# Patient Record
Sex: FEMALE | Race: WHITE | NOT HISPANIC OR LATINO | Employment: FULL TIME | URBAN - METROPOLITAN AREA
[De-identification: names, ages, dates, MRNs, and addresses within clinical notes are randomized per-mention and may not be internally consistent; named-entity substitution may affect disease eponyms.]

---

## 2023-08-30 LAB
CHLAMYDIA TRACH., AMPLIFIED: NEGATIVE
N. GONORRHEA, AMPLIFIED: NEGATIVE
TRICHOMONAS VAGINALIS: NEGATIVE

## 2023-10-24 PROBLEM — O46.90 VAGINAL BLEEDING IN PREGNANCY (HHS-HCC): Status: ACTIVE | Noted: 2023-10-24

## 2023-10-24 PROBLEM — H46.9 OPTIC NEURITIS, LEFT: Status: ACTIVE | Noted: 2023-10-24

## 2023-10-26 ENCOUNTER — PROCEDURE VISIT (OUTPATIENT)
Dept: OBSTETRICS AND GYNECOLOGY | Facility: CLINIC | Age: 23
End: 2023-10-26
Payer: COMMERCIAL

## 2023-10-26 ENCOUNTER — APPOINTMENT (OUTPATIENT)
Dept: OBSTETRICS AND GYNECOLOGY | Facility: CLINIC | Age: 23
End: 2023-10-26
Payer: COMMERCIAL

## 2023-10-26 VITALS
DIASTOLIC BLOOD PRESSURE: 82 MMHG | SYSTOLIC BLOOD PRESSURE: 136 MMHG | BODY MASS INDEX: 44.17 KG/M2 | WEIGHT: 265.1 LBS | HEIGHT: 65 IN

## 2023-10-26 DIAGNOSIS — R87.619 ABNORMAL CERVICAL PAPANICOLAOU SMEAR, UNSPECIFIED ABNORMAL PAP FINDING: ICD-10-CM

## 2023-10-26 DIAGNOSIS — R87.612 LGSIL ON PAP SMEAR OF CERVIX: Primary | ICD-10-CM

## 2023-10-26 LAB — PREGNANCY TEST URINE, POC: NEGATIVE

## 2023-10-26 PROCEDURE — 88342 IMHCHEM/IMCYTCHM 1ST ANTB: CPT

## 2023-10-26 PROCEDURE — 57454 BX/CURETT OF CERVIX W/SCOPE: CPT | Performed by: OBSTETRICS & GYNECOLOGY

## 2023-10-26 PROCEDURE — 81025 URINE PREGNANCY TEST: CPT | Performed by: OBSTETRICS & GYNECOLOGY

## 2023-10-26 PROCEDURE — 88305 TISSUE EXAM BY PATHOLOGIST: CPT

## 2023-10-26 NOTE — PROGRESS NOTES
Colposcopy Procedure Note    Indications: Pap smear 2 months ago showed: low-grade squamous intraepithelial neoplasia (LGSIL - encompassing HPV,mild dysplasia,HERNÁN I). The prior pap showed no abnormalities.    Procedure Details   The risks and benefits of the procedure and Written informed consent obtained.    Speculum placed in vagina and excellent visualization of cervix achieved, cervix swabbed x 3 with acetic acid solution.    Findings:  Cervix: acetowhite lesion(s) noted at 12 o'clock; endocervical curettage performed and cervical biopsies taken at 12 o'clock.  Vaginal inspection: normal without visible lesions.  Vulvar colposcopy: normal mucosa without lesions.    Specimens: Cervical biopsy at 12:00, ECC    Complications: none.    Plan:  Specimens labelled and sent to Pathology.  Will base further treatment on Pathology findings.  Treatment options discussed with patient.  Post biopsy instructions given to patient.Patient ID: Zuleika Mendoza is a 23 y.o. female.    Procedures

## 2023-11-14 LAB
LAB AP ASR DISCLAIMER: NORMAL
LABORATORY COMMENT REPORT: NORMAL
PATH REPORT.FINAL DX SPEC: NORMAL
PATH REPORT.GROSS SPEC: NORMAL
PATH REPORT.RELEVANT HX SPEC: NORMAL
PATH REPORT.TOTAL CANCER: NORMAL
RESIDENT REVIEW: NORMAL

## 2023-11-15 ENCOUNTER — TELEPHONE (OUTPATIENT)
Dept: OBSTETRICS AND GYNECOLOGY | Facility: CLINIC | Age: 23
End: 2023-11-15
Payer: COMMERCIAL

## 2023-11-15 NOTE — TELEPHONE ENCOUNTER
Spoke to patient on the phone.  Reviewed recent colposcopy biopsies with high-grade MICHAEL on cervix and ECC.  Recommend LEEP procedure, risk benefits and alternatives explained and she agreed.  We will schedule surgery and proceed accordingly.

## 2023-11-21 ENCOUNTER — PREP FOR PROCEDURE (OUTPATIENT)
Dept: OBSTETRICS AND GYNECOLOGY | Facility: CLINIC | Age: 23
End: 2023-11-21
Payer: COMMERCIAL

## 2023-11-21 DIAGNOSIS — R87.613 HGSIL (HIGH GRADE SQUAMOUS INTRAEPITHELIAL LESION) ON PAP SMEAR OF CERVIX: Primary | ICD-10-CM

## 2023-12-05 ENCOUNTER — TELEPHONE (OUTPATIENT)
Dept: OBSTETRICS AND GYNECOLOGY | Facility: CLINIC | Age: 23
End: 2023-12-05
Payer: COMMERCIAL

## 2023-12-05 PROBLEM — R87.613 HGSIL (HIGH GRADE SQUAMOUS INTRAEPITHELIAL LESION) ON PAP SMEAR OF CERVIX: Status: ACTIVE | Noted: 2023-11-21

## 2023-12-05 NOTE — TELEPHONE ENCOUNTER
Patient is scheduled for a LEEP on 1/8/2024.  Pre admission testing explained to patient and she was instructed to call with any questions.

## 2024-01-03 ENCOUNTER — PRE-ADMISSION TESTING (OUTPATIENT)
Dept: PREADMISSION TESTING | Facility: HOSPITAL | Age: 24
End: 2024-01-03
Payer: COMMERCIAL

## 2024-01-03 VITALS
DIASTOLIC BLOOD PRESSURE: 80 MMHG | OXYGEN SATURATION: 98 % | HEART RATE: 82 BPM | TEMPERATURE: 98.1 F | SYSTOLIC BLOOD PRESSURE: 135 MMHG | BODY MASS INDEX: 43.64 KG/M2 | HEIGHT: 65 IN | RESPIRATION RATE: 20 BRPM | WEIGHT: 261.91 LBS

## 2024-01-03 DIAGNOSIS — R87.613 HGSIL (HIGH GRADE SQUAMOUS INTRAEPITHELIAL LESION) ON PAP SMEAR OF CERVIX: ICD-10-CM

## 2024-01-03 DIAGNOSIS — Z01.818 PRE-OP TESTING: ICD-10-CM

## 2024-01-03 LAB
ABO GROUP (TYPE) IN BLOOD: NORMAL
ANION GAP SERPL CALC-SCNC: 12 MMOL/L (ref 10–20)
ANTIBODY SCREEN: NORMAL
BUN SERPL-MCNC: 10 MG/DL (ref 6–23)
CALCIUM SERPL-MCNC: 9.5 MG/DL (ref 8.6–10.3)
CHLORIDE SERPL-SCNC: 104 MMOL/L (ref 98–107)
CO2 SERPL-SCNC: 25 MMOL/L (ref 21–32)
CREAT SERPL-MCNC: 0.64 MG/DL (ref 0.5–1.05)
ERYTHROCYTE [DISTWIDTH] IN BLOOD BY AUTOMATED COUNT: 12.1 % (ref 11.5–14.5)
GFR SERPL CREATININE-BSD FRML MDRD: >90 ML/MIN/1.73M*2
GLUCOSE SERPL-MCNC: 80 MG/DL (ref 74–99)
HCT VFR BLD AUTO: 41.2 % (ref 36–46)
HGB BLD-MCNC: 14.3 G/DL (ref 12–16)
MCH RBC QN AUTO: 32.1 PG (ref 26–34)
MCHC RBC AUTO-ENTMCNC: 34.7 G/DL (ref 32–36)
MCV RBC AUTO: 93 FL (ref 80–100)
NRBC BLD-RTO: 0 /100 WBCS (ref 0–0)
PLATELET # BLD AUTO: 366 X10*3/UL (ref 150–450)
POTASSIUM SERPL-SCNC: 4.4 MMOL/L (ref 3.5–5.3)
RBC # BLD AUTO: 4.45 X10*6/UL (ref 4–5.2)
RH FACTOR (ANTIGEN D): NORMAL
SODIUM SERPL-SCNC: 137 MMOL/L (ref 136–145)
WBC # BLD AUTO: 11.2 X10*3/UL (ref 4.4–11.3)

## 2024-01-03 PROCEDURE — 36415 COLL VENOUS BLD VENIPUNCTURE: CPT

## 2024-01-03 PROCEDURE — 93010 ELECTROCARDIOGRAM REPORT: CPT | Performed by: INTERNAL MEDICINE

## 2024-01-03 PROCEDURE — 85027 COMPLETE CBC AUTOMATED: CPT

## 2024-01-03 PROCEDURE — 93005 ELECTROCARDIOGRAM TRACING: CPT

## 2024-01-03 PROCEDURE — 86900 BLOOD TYPING SEROLOGIC ABO: CPT

## 2024-01-03 PROCEDURE — 99203 OFFICE O/P NEW LOW 30 MIN: CPT | Performed by: NURSE PRACTITIONER

## 2024-01-03 PROCEDURE — 94760 N-INVAS EAR/PLS OXIMETRY 1: CPT

## 2024-01-03 PROCEDURE — 82374 ASSAY BLOOD CARBON DIOXIDE: CPT

## 2024-01-03 RX ORDER — ASCORBIC ACID 500 MG
500 TABLET ORAL DAILY
COMMUNITY

## 2024-01-03 ASSESSMENT — DUKE ACTIVITY SCORE INDEX (DASI)
DASI METS SCORE: 9
CAN YOU WALK A BLOCK OR TWO ON LEVEL GROUND: YES
CAN YOU PARTICIPATE IN MODERATE RECREATIONAL ACTIVITIES LIKE GOLF, BOWLING, DANCING, DOUBLES TENNIS OR THROWING A BASEBALL OR FOOTBALL: YES
TOTAL_SCORE: 50.7
CAN YOU DO HEAVY WORK AROUND THE HOUSE LIKE SCRUBBING FLOORS OR LIFTING AND MOVING HEAVY FURNITURE: YES
CAN YOU WALK INDOORS, SUCH AS AROUND YOUR HOUSE: YES
CAN YOU RUN A SHORT DISTANCE: YES
CAN YOU DO LIGHT WORK AROUND THE HOUSE LIKE DUSTING OR WASHING DISHES: YES
CAN YOU PARTICIPATE IN STRENOUS SPORTS LIKE SWIMMING, SINGLES TENNIS, FOOTBALL, BASKETBALL, OR SKIING: NO
CAN YOU CLIMB A FLIGHT OF STAIRS OR WALK UP A HILL: YES
CAN YOU TAKE CARE OF YOURSELF (EAT, DRESS, BATHE, OR USE TOILET): YES
CAN YOU DO YARD WORK LIKE RAKING LEAVES, WEEDING OR PUSHING A MOWER: YES
CAN YOU HAVE SEXUAL RELATIONS: YES
CAN YOU DO MODERATE WORK AROUND THE HOUSE LIKE VACUUMING, SWEEPING FLOORS OR CARRYING GROCERIES: YES

## 2024-01-03 ASSESSMENT — PAIN SCALES - GENERAL: PAINLEVEL_OUTOF10: 0 - NO PAIN

## 2024-01-03 NOTE — PREPROCEDURE INSTRUCTIONS
Medication List            Accurate as of January 3, 2024  1:07 PM. Always use your most recent med list.                ascorbic acid 500 mg tablet  Commonly known as: Vitamin C  Medication Adjustments for Surgery: Stop 7 days before surgery     calcium citrate-vitamin D2 250 mg-2.5 mcg (100 unit) tablet  Medication Adjustments for Surgery: Stop 7 days before surgery     ocrelizumab 600 mg in sodium chloride 0.9% 480 mL IV  Medication Adjustments for Surgery: Continue until night before surgery     ORKAMBI ORAL  Medication Adjustments for Surgery: Stop 7 days before surgery  Notes to patient: Patient does NOT take, need to remove this medication            PRE-OPERATIVE INSTRUCTIONS FOR SURGERY    *Do not eat anything after midnight the night of surgery.  This includes food of any kind (including hard candy, cough drops, mints and gum) and beverages (including coffee and soda).        *One of our staff members will call you ONE business day before your surgery, between 11a-2p  (expect call Friday 1/5/24)  To let you know the time to arrive.      If you have no received a call by 2 pm, call [phone]    *When you arrive at the hospital-->GO TO Registration on the ground floor    *Stop smoking 24 hours prior to surgery.  No Marijuana, CBD Oil or Vaping for 48 hours    *No alcohol 24 hours prior to surgery    *You will need a responsible adult to drive you home    -No acrylic nails or nail polish on at least one fingernail, NO polish on toes for foot surgery    -You may be asked to remove your dentures, partial plate, eyeglasses or contact lenses before going to surgery.  Please bring a case for these items.    -Body piercings need to be removed.  Jewelry and valuables should be left at home.    -Put on loose,  comfortable, clean clothing, that will accommodate bandages    MORNING OF SURGERY:    Take shower to remove all skin lotions, perfumes, deodorant, etc.  CO NOT REAPPLY ANYTHING TO HAIR OR SKIN.    -What  you may be asked to bring to surgery:  ___photo ID an insurance information  ___Urine specimen          NPO Instructions:    Do not eat any food after midnight the night before your surgery/procedure.    Additional Instructions:     Day of Surgery:  Wear  comfortable loose fitting clothing  Do not use moisturizers, creams, lotions or perfume  All jewelry and valuables should be left at home

## 2024-01-03 NOTE — H&P (VIEW-ONLY)
"CPM/PAT Evaluation       Name: Zuleiak Mendoza (Zuleika Mendoza)  /Age: 2000/23 y.o.     In-Person       Chief Complaint: HGSIL on pap smear of cervix    23 yr old female with c/o abnormal pap smear.  Reports incidental finding during routine gynological screening.  Reports hx of in office \"pinch biopsy\" which showed need for further treatment upon test results.  Denies any symptoms or changes in menses although mentral flow is heavy but has become the norm for more than a year ago.  Reports remaining otherwise physically active, denies cardiac or respiratory symptoms.  No previous general anesthesia.           Past Medical History:   Diagnosis Date    Immunocompromised (CMS/HCC)     Migraines     Multiple sclerosis (CMS/HCC)        Past Surgical History:   Procedure Laterality Date    OTHER SURGICAL HISTORY  2021    Surgically induced        Patient Sexual activity questions deferred to the physician.    Family History   Problem Relation Name Age of Onset    Hip dysplasia Mother      Anemia Mother      Migraines Mother      Migraines Father         Allergies   Allergen Reactions    Amoxicillin Rash    Penicillins Rash       Prior to Admission medications    Not on File        Review of Systems    Constitutional: no fever, no chills and not feeling poorly.   Eyes: no eyesight problems.   ENT: no hearing loss, no nosebleeds and no sore throat.   Cardiovascular: no chest pain, no palpitations and no extremity edema.   Respiratory: no shortness of breath, no wheezing, no cough and no sob with exertion.   Gastrointestinal: negative for abdominal pain, blood in stools or changes in bowel habits   Genitourinary: negative for dysuria, incontinence or changes in urinary habits   Musculoskeletal: no arthralgias and no gait abnormality.   Integumentary: negative for lesions, rash or itching.   Neurological: negative for confusion, dizziness, fainting or difficulty walking.   Psychiatric: not suicidal, no " anxiety and no depression.   All other systems have been reviewed and are negative for complaint.     Physical Exam  Constitutional:       General: No acute distress.     Aox3, pleasant and cooperative, appropriate mood and eye contact   HENT:      Head: Normocephalic.      Mouth/Throat: Mucous membranes moist & pink  Eyes:      Vision grossly intact, PERRLA, corrective lenses   Neck:      No carotid bruit, no JVD  Cardiovascular:      RRR, S1S2, no murmurs, rubs or gallops  Pulmonary:      Symmetric chest expansion, CTA, Room Air  Abdominal:      Soft non-tender, BSx4   Skin:     Warm, dry & intact   Extremities:      No gross deformities; normal gait   Neurological:      No focal deficit, Aox3, RAYMOND x4  Psychiatric:      Pleasant & cooperative, appropriate affect    PAT AIRWAY:   Airway:     Mallampati::  II    TM distance::  >3 FB    Neck ROM::  Full  normal        Visit Vitals  /80   Pulse 82   Temp 36.7 °C (98.1 °F) (Temporal)   Resp 20       DASI Risk Score      Flowsheet Row Most Recent Value   DASI SCORE 50.7   METS Score (Will be calculated only when all the questions are answered) 9          Caprini DVT Assessment    No data to display       Modified Frailty Index    No data to display       CHADS2 Stroke Risk  Current as of 9 minutes ago        N/A 3 - 100%: High Risk   2 - 3%: Medium Risk   0 - 2%: Low Risk     Last Change: N/A          This score determines the patient's risk of having a stroke if the patient has atrial fibrillation.        This score is not applicable to this patient. Components are not calculated.          Revised Cardiac Risk Index    No data to display       Apfel Simplified Score    No data to display       Risk Analysis Index Results This Encounter    No data found in the last 1 encounters.       Stop Bang Score      Flowsheet Row Most Recent Value   Do you snore loudly? 0   Do you often feel tired or fatigued after your sleep? 0   Has anyone ever observed you stop breathing  in your sleep? 0   Do you have or are you being treated for high blood pressure? 0   Recent BMI (Calculated) 44.1   Is BMI greater than 35 kg/m2? 1=Yes   Age older than 50 years old? 0=No   Is your neck circumference greater than 17 inches (Male) or 16 inches (Female)? 0   Gender - Male 0=No   STOP-BANG Total Score 1            Assessment and Plan:     Followed by OB, HGSIL on pap smear of cervix    LEEP w/Dr. Alvarado on 1/8/2024

## 2024-01-03 NOTE — CPM/PAT H&P
"CPM/PAT Evaluation       Name: Zuleika Mendoza (Zuleika Mendoza)  /Age: 2000/23 y.o.     In-Person       Chief Complaint: HGSIL on pap smear of cervix    23 yr old female with c/o abnormal pap smear.  Reports incidental finding during routine gynological screening.  Reports hx of in office \"pinch biopsy\" which showed need for further treatment upon test results.  Denies any symptoms or changes in menses although mentral flow is heavy but has become the norm for more than a year ago.  Reports remaining otherwise physically active, denies cardiac or respiratory symptoms.  No previous general anesthesia.           Past Medical History:   Diagnosis Date    Immunocompromised (CMS/HCC)     Migraines     Multiple sclerosis (CMS/HCC)        Past Surgical History:   Procedure Laterality Date    OTHER SURGICAL HISTORY  2021    Surgically induced        Patient Sexual activity questions deferred to the physician.    Family History   Problem Relation Name Age of Onset    Hip dysplasia Mother      Anemia Mother      Migraines Mother      Migraines Father         Allergies   Allergen Reactions    Amoxicillin Rash    Penicillins Rash       Prior to Admission medications    Not on File        Review of Systems    Constitutional: no fever, no chills and not feeling poorly.   Eyes: no eyesight problems.   ENT: no hearing loss, no nosebleeds and no sore throat.   Cardiovascular: no chest pain, no palpitations and no extremity edema.   Respiratory: no shortness of breath, no wheezing, no cough and no sob with exertion.   Gastrointestinal: negative for abdominal pain, blood in stools or changes in bowel habits   Genitourinary: negative for dysuria, incontinence or changes in urinary habits   Musculoskeletal: no arthralgias and no gait abnormality.   Integumentary: negative for lesions, rash or itching.   Neurological: negative for confusion, dizziness, fainting or difficulty walking.   Psychiatric: not suicidal, no " anxiety and no depression.   All other systems have been reviewed and are negative for complaint.     Physical Exam  Constitutional:       General: No acute distress.     Aox3, pleasant and cooperative, appropriate mood and eye contact   HENT:      Head: Normocephalic.      Mouth/Throat: Mucous membranes moist & pink  Eyes:      Vision grossly intact, PERRLA, corrective lenses   Neck:      No carotid bruit, no JVD  Cardiovascular:      RRR, S1S2, no murmurs, rubs or gallops  Pulmonary:      Symmetric chest expansion, CTA, Room Air  Abdominal:      Soft non-tender, BSx4   Skin:     Warm, dry & intact   Extremities:      No gross deformities; normal gait   Neurological:      No focal deficit, Aox3, RAYMOND x4  Psychiatric:      Pleasant & cooperative, appropriate affect    PAT AIRWAY:   Airway:     Mallampati::  II    TM distance::  >3 FB    Neck ROM::  Full  normal        Visit Vitals  /80   Pulse 82   Temp 36.7 °C (98.1 °F) (Temporal)   Resp 20       DASI Risk Score      Flowsheet Row Most Recent Value   DASI SCORE 50.7   METS Score (Will be calculated only when all the questions are answered) 9          Caprini DVT Assessment    No data to display       Modified Frailty Index    No data to display       CHADS2 Stroke Risk  Current as of 9 minutes ago        N/A 3 - 100%: High Risk   2 - 3%: Medium Risk   0 - 2%: Low Risk     Last Change: N/A          This score determines the patient's risk of having a stroke if the patient has atrial fibrillation.        This score is not applicable to this patient. Components are not calculated.          Revised Cardiac Risk Index    No data to display       Apfel Simplified Score    No data to display       Risk Analysis Index Results This Encounter    No data found in the last 1 encounters.       Stop Bang Score      Flowsheet Row Most Recent Value   Do you snore loudly? 0   Do you often feel tired or fatigued after your sleep? 0   Has anyone ever observed you stop breathing  in your sleep? 0   Do you have or are you being treated for high blood pressure? 0   Recent BMI (Calculated) 44.1   Is BMI greater than 35 kg/m2? 1=Yes   Age older than 50 years old? 0=No   Is your neck circumference greater than 17 inches (Male) or 16 inches (Female)? 0   Gender - Male 0=No   STOP-BANG Total Score 1            Assessment and Plan:     Followed by OB, HGSIL on pap smear of cervix    LEEP w/Dr. Alvarado on 1/8/2024

## 2024-01-08 ENCOUNTER — ANESTHESIA (OUTPATIENT)
Dept: OPERATING ROOM | Facility: HOSPITAL | Age: 24
End: 2024-01-08
Payer: COMMERCIAL

## 2024-01-08 ENCOUNTER — HOSPITAL ENCOUNTER (OUTPATIENT)
Facility: HOSPITAL | Age: 24
Setting detail: OUTPATIENT SURGERY
Discharge: HOME | End: 2024-01-08
Attending: OBSTETRICS & GYNECOLOGY | Admitting: OBSTETRICS & GYNECOLOGY
Payer: COMMERCIAL

## 2024-01-08 ENCOUNTER — ANESTHESIA EVENT (OUTPATIENT)
Dept: OPERATING ROOM | Facility: HOSPITAL | Age: 24
End: 2024-01-08
Payer: COMMERCIAL

## 2024-01-08 VITALS
WEIGHT: 261.91 LBS | DIASTOLIC BLOOD PRESSURE: 87 MMHG | BODY MASS INDEX: 43.64 KG/M2 | RESPIRATION RATE: 16 BRPM | OXYGEN SATURATION: 98 % | TEMPERATURE: 98.1 F | SYSTOLIC BLOOD PRESSURE: 156 MMHG | HEART RATE: 69 BPM | HEIGHT: 65 IN

## 2024-01-08 DIAGNOSIS — Z01.818 PRE-OP TESTING: ICD-10-CM

## 2024-01-08 DIAGNOSIS — R87.613 HGSIL (HIGH GRADE SQUAMOUS INTRAEPITHELIAL LESION) ON PAP SMEAR OF CERVIX: Primary | ICD-10-CM

## 2024-01-08 LAB — PREGNANCY TEST URINE, POC: NEGATIVE

## 2024-01-08 PROCEDURE — 3700000002 HC GENERAL ANESTHESIA TIME - EACH INCREMENTAL 1 MINUTE: Performed by: OBSTETRICS & GYNECOLOGY

## 2024-01-08 PROCEDURE — 2720000007 HC OR 272 NO HCPCS: Performed by: OBSTETRICS & GYNECOLOGY

## 2024-01-08 PROCEDURE — 2500000005 HC RX 250 GENERAL PHARMACY W/O HCPCS: Performed by: NURSE ANESTHETIST, CERTIFIED REGISTERED

## 2024-01-08 PROCEDURE — 7100000001 HC RECOVERY ROOM TIME - INITIAL BASE CHARGE: Performed by: OBSTETRICS & GYNECOLOGY

## 2024-01-08 PROCEDURE — A57522 PR CONIZATION CERVIX,LOOP ELECTRD: Performed by: ANESTHESIOLOGY

## 2024-01-08 PROCEDURE — 94760 N-INVAS EAR/PLS OXIMETRY 1: CPT

## 2024-01-08 PROCEDURE — 57522 CONIZATION OF CERVIX: CPT | Performed by: OBSTETRICS & GYNECOLOGY

## 2024-01-08 PROCEDURE — 88307 TISSUE EXAM BY PATHOLOGIST: CPT | Performed by: PATHOLOGY

## 2024-01-08 PROCEDURE — 81025 URINE PREGNANCY TEST: CPT | Performed by: OBSTETRICS & GYNECOLOGY

## 2024-01-08 PROCEDURE — A57522 PR CONIZATION CERVIX,LOOP ELECTRD: Performed by: NURSE ANESTHETIST, CERTIFIED REGISTERED

## 2024-01-08 PROCEDURE — 7100000009 HC PHASE TWO TIME - INITIAL BASE CHARGE: Performed by: OBSTETRICS & GYNECOLOGY

## 2024-01-08 PROCEDURE — 3700000001 HC GENERAL ANESTHESIA TIME - INITIAL BASE CHARGE: Performed by: OBSTETRICS & GYNECOLOGY

## 2024-01-08 PROCEDURE — 7100000002 HC RECOVERY ROOM TIME - EACH INCREMENTAL 1 MINUTE: Performed by: OBSTETRICS & GYNECOLOGY

## 2024-01-08 PROCEDURE — 3600000003 HC OR TIME - INITIAL BASE CHARGE - PROCEDURE LEVEL THREE: Performed by: OBSTETRICS & GYNECOLOGY

## 2024-01-08 PROCEDURE — A4217 STERILE WATER/SALINE, 500 ML: HCPCS | Performed by: OBSTETRICS & GYNECOLOGY

## 2024-01-08 PROCEDURE — 2500000004 HC RX 250 GENERAL PHARMACY W/ HCPCS (ALT 636 FOR OP/ED): Performed by: NURSE ANESTHETIST, CERTIFIED REGISTERED

## 2024-01-08 PROCEDURE — 3600000008 HC OR TIME - EACH INCREMENTAL 1 MINUTE - PROCEDURE LEVEL THREE: Performed by: OBSTETRICS & GYNECOLOGY

## 2024-01-08 PROCEDURE — 2500000005 HC RX 250 GENERAL PHARMACY W/O HCPCS: Performed by: OBSTETRICS & GYNECOLOGY

## 2024-01-08 PROCEDURE — 2500000004 HC RX 250 GENERAL PHARMACY W/ HCPCS (ALT 636 FOR OP/ED): Performed by: OBSTETRICS & GYNECOLOGY

## 2024-01-08 PROCEDURE — 7100000010 HC PHASE TWO TIME - EACH INCREMENTAL 1 MINUTE: Performed by: OBSTETRICS & GYNECOLOGY

## 2024-01-08 PROCEDURE — 88307 TISSUE EXAM BY PATHOLOGIST: CPT | Mod: TC,SUR,PARLAB | Performed by: OBSTETRICS & GYNECOLOGY

## 2024-01-08 RX ORDER — MIDAZOLAM HYDROCHLORIDE 1 MG/ML
INJECTION, SOLUTION INTRAMUSCULAR; INTRAVENOUS AS NEEDED
Status: DISCONTINUED | OUTPATIENT
Start: 2024-01-08 | End: 2024-01-08

## 2024-01-08 RX ORDER — SODIUM CHLORIDE, SODIUM LACTATE, POTASSIUM CHLORIDE, CALCIUM CHLORIDE 600; 310; 30; 20 MG/100ML; MG/100ML; MG/100ML; MG/100ML
100 INJECTION, SOLUTION INTRAVENOUS CONTINUOUS
Status: DISCONTINUED | OUTPATIENT
Start: 2024-01-08 | End: 2024-01-08 | Stop reason: HOSPADM

## 2024-01-08 RX ORDER — PROPOFOL 10 MG/ML
INJECTION, EMULSION INTRAVENOUS AS NEEDED
Status: DISCONTINUED | OUTPATIENT
Start: 2024-01-08 | End: 2024-01-08

## 2024-01-08 RX ORDER — FENTANYL CITRATE 50 UG/ML
INJECTION, SOLUTION INTRAMUSCULAR; INTRAVENOUS AS NEEDED
Status: DISCONTINUED | OUTPATIENT
Start: 2024-01-08 | End: 2024-01-08

## 2024-01-08 RX ORDER — METOPROLOL TARTRATE 1 MG/ML
5 INJECTION, SOLUTION INTRAVENOUS ONCE
Status: DISCONTINUED | OUTPATIENT
Start: 2024-01-08 | End: 2024-01-08 | Stop reason: HOSPADM

## 2024-01-08 RX ORDER — DEXAMETHASONE SODIUM PHOSPHATE 4 MG/ML
INJECTION, SOLUTION INTRA-ARTICULAR; INTRALESIONAL; INTRAMUSCULAR; INTRAVENOUS; SOFT TISSUE AS NEEDED
Status: DISCONTINUED | OUTPATIENT
Start: 2024-01-08 | End: 2024-01-08

## 2024-01-08 RX ORDER — FAMOTIDINE 10 MG/ML
20 INJECTION INTRAVENOUS ONCE
Status: DISCONTINUED | OUTPATIENT
Start: 2024-01-08 | End: 2024-01-08 | Stop reason: HOSPADM

## 2024-01-08 RX ORDER — ONDANSETRON HYDROCHLORIDE 2 MG/ML
4 INJECTION, SOLUTION INTRAVENOUS ONCE AS NEEDED
Status: DISCONTINUED | OUTPATIENT
Start: 2024-01-08 | End: 2024-01-08 | Stop reason: HOSPADM

## 2024-01-08 RX ORDER — ONDANSETRON HYDROCHLORIDE 2 MG/ML
INJECTION, SOLUTION INTRAVENOUS AS NEEDED
Status: DISCONTINUED | OUTPATIENT
Start: 2024-01-08 | End: 2024-01-08

## 2024-01-08 RX ORDER — SCOLOPAMINE TRANSDERMAL SYSTEM 1 MG/1
1 PATCH, EXTENDED RELEASE TRANSDERMAL ONCE
Status: DISCONTINUED | OUTPATIENT
Start: 2024-01-08 | End: 2024-01-08 | Stop reason: HOSPADM

## 2024-01-08 RX ORDER — ALBUTEROL SULFATE 0.83 MG/ML
2.5 SOLUTION RESPIRATORY (INHALATION) ONCE AS NEEDED
Status: DISCONTINUED | OUTPATIENT
Start: 2024-01-08 | End: 2024-01-08 | Stop reason: HOSPADM

## 2024-01-08 RX ORDER — HYDRALAZINE HYDROCHLORIDE 20 MG/ML
5 INJECTION INTRAMUSCULAR; INTRAVENOUS EVERY 30 MIN PRN
Status: DISCONTINUED | OUTPATIENT
Start: 2024-01-08 | End: 2024-01-08 | Stop reason: HOSPADM

## 2024-01-08 RX ORDER — SODIUM CHLORIDE 0.9 G/100ML
IRRIGANT IRRIGATION AS NEEDED
Status: DISCONTINUED | OUTPATIENT
Start: 2024-01-08 | End: 2024-01-08 | Stop reason: HOSPADM

## 2024-01-08 RX ORDER — MIDAZOLAM HYDROCHLORIDE 1 MG/ML
1 INJECTION, SOLUTION INTRAMUSCULAR; INTRAVENOUS ONCE AS NEEDED
Status: DISCONTINUED | OUTPATIENT
Start: 2024-01-08 | End: 2024-01-08 | Stop reason: HOSPADM

## 2024-01-08 RX ORDER — ACETAMINOPHEN 325 MG/1
975 TABLET ORAL ONCE
Status: DISCONTINUED | OUTPATIENT
Start: 2024-01-08 | End: 2024-01-08 | Stop reason: HOSPADM

## 2024-01-08 RX ORDER — SODIUM CHLORIDE, SODIUM LACTATE, POTASSIUM CHLORIDE, CALCIUM CHLORIDE 600; 310; 30; 20 MG/100ML; MG/100ML; MG/100ML; MG/100ML
INJECTION, SOLUTION INTRAVENOUS CONTINUOUS PRN
Status: DISCONTINUED | OUTPATIENT
Start: 2024-01-08 | End: 2024-01-08

## 2024-01-08 RX ORDER — MORPHINE SULFATE 2 MG/ML
2 INJECTION, SOLUTION INTRAMUSCULAR; INTRAVENOUS EVERY 5 MIN PRN
Status: DISCONTINUED | OUTPATIENT
Start: 2024-01-08 | End: 2024-01-08 | Stop reason: HOSPADM

## 2024-01-08 RX ORDER — MEPERIDINE HYDROCHLORIDE 25 MG/ML
INJECTION INTRAMUSCULAR; INTRAVENOUS; SUBCUTANEOUS AS NEEDED
Status: DISCONTINUED | OUTPATIENT
Start: 2024-01-08 | End: 2024-01-08

## 2024-01-08 RX ORDER — DIPHENHYDRAMINE HYDROCHLORIDE 50 MG/ML
25 INJECTION INTRAMUSCULAR; INTRAVENOUS ONCE AS NEEDED
Status: DISCONTINUED | OUTPATIENT
Start: 2024-01-08 | End: 2024-01-08 | Stop reason: HOSPADM

## 2024-01-08 RX ORDER — MEPERIDINE HYDROCHLORIDE 25 MG/ML
12.5 INJECTION INTRAMUSCULAR; INTRAVENOUS; SUBCUTANEOUS EVERY 10 MIN PRN
Status: DISCONTINUED | OUTPATIENT
Start: 2024-01-08 | End: 2024-01-08 | Stop reason: HOSPADM

## 2024-01-08 RX ORDER — LABETALOL HYDROCHLORIDE 5 MG/ML
5 INJECTION, SOLUTION INTRAVENOUS ONCE AS NEEDED
Status: DISCONTINUED | OUTPATIENT
Start: 2024-01-08 | End: 2024-01-08 | Stop reason: HOSPADM

## 2024-01-08 RX ORDER — HYDROMORPHONE HYDROCHLORIDE 1 MG/ML
1 INJECTION, SOLUTION INTRAMUSCULAR; INTRAVENOUS; SUBCUTANEOUS EVERY 5 MIN PRN
Status: DISCONTINUED | OUTPATIENT
Start: 2024-01-08 | End: 2024-01-08 | Stop reason: HOSPADM

## 2024-01-08 RX ORDER — LIDOCAINE HYDROCHLORIDE 20 MG/ML
INJECTION, SOLUTION INFILTRATION; PERINEURAL AS NEEDED
Status: DISCONTINUED | OUTPATIENT
Start: 2024-01-08 | End: 2024-01-08

## 2024-01-08 RX ORDER — LIDOCAINE HYDROCHLORIDE AND EPINEPHRINE 10; 10 MG/ML; UG/ML
INJECTION, SOLUTION INFILTRATION; PERINEURAL AS NEEDED
Status: DISCONTINUED | OUTPATIENT
Start: 2024-01-08 | End: 2024-01-08 | Stop reason: HOSPADM

## 2024-01-08 RX ORDER — KETOROLAC TROMETHAMINE 30 MG/ML
INJECTION, SOLUTION INTRAMUSCULAR; INTRAVENOUS AS NEEDED
Status: DISCONTINUED | OUTPATIENT
Start: 2024-01-08 | End: 2024-01-08

## 2024-01-08 RX ADMIN — KETOROLAC TROMETHAMINE 30 MG: 30 INJECTION, SOLUTION INTRAMUSCULAR; INTRAVENOUS at 09:25

## 2024-01-08 RX ADMIN — DEXAMETHASONE SODIUM PHOSPHATE 4 MG: 4 INJECTION, SOLUTION INTRAMUSCULAR; INTRAVENOUS at 09:15

## 2024-01-08 RX ADMIN — ONDANSETRON 4 MG: 2 INJECTION INTRAMUSCULAR; INTRAVENOUS at 09:15

## 2024-01-08 RX ADMIN — MEPERIDINE HYDROCHLORIDE 25 MG: 25 INJECTION INTRAMUSCULAR; INTRAVENOUS; SUBCUTANEOUS at 09:25

## 2024-01-08 RX ADMIN — MIDAZOLAM 2 MG: 1 INJECTION INTRAMUSCULAR; INTRAVENOUS at 09:04

## 2024-01-08 RX ADMIN — FENTANYL CITRATE 100 MCG: 50 INJECTION, SOLUTION INTRAMUSCULAR; INTRAVENOUS at 09:04

## 2024-01-08 RX ADMIN — SODIUM CHLORIDE, SODIUM LACTATE, POTASSIUM CHLORIDE, AND CALCIUM CHLORIDE: 600; 310; 30; 20 INJECTION, SOLUTION INTRAVENOUS at 09:00

## 2024-01-08 RX ADMIN — PROPOFOL 200 MG: 10 INJECTION, EMULSION INTRAVENOUS at 09:04

## 2024-01-08 RX ADMIN — LIDOCAINE HYDROCHLORIDE 100 MG: 20 INJECTION, SOLUTION INFILTRATION; PERINEURAL at 09:04

## 2024-01-08 SDOH — HEALTH STABILITY: MENTAL HEALTH: CURRENT SMOKER: 0

## 2024-01-08 ASSESSMENT — PAIN SCALES - GENERAL
PAINLEVEL_OUTOF10: 0 - NO PAIN
PAIN_LEVEL: 0
PAINLEVEL_OUTOF10: 0 - NO PAIN

## 2024-01-08 ASSESSMENT — PAIN - FUNCTIONAL ASSESSMENT
PAIN_FUNCTIONAL_ASSESSMENT: 0-10
PAIN_FUNCTIONAL_ASSESSMENT: 0-10

## 2024-01-08 NOTE — ANESTHESIA PREPROCEDURE EVALUATION
Patient: Zuleika Mendoza    Procedure Information       Date/Time: 01/08/24 0900    Procedure: LEEP    Location: [facility] / [facility]    Surgeons: Efren Alvarado MD            Relevant Problems   Anesthesia (within normal limits)       Clinical information reviewed:   Tobacco  Allergies  Meds   Med Hx  Surg Hx  OB Status  Fam Hx  Soc   Hx        NPO Detail:  NPO/Void Status  Date of Last Liquid: 01/07/24  Time of Last Liquid: 2200  Date of Last Solid: 01/07/24  Time of Last Solid: 2000         Physical Exam    Airway  Mallampati: II  TM distance: >3 FB  Neck ROM: full     Cardiovascular - normal exam  Rhythm: regular  Rate: normal     Dental - normal exam     Pulmonary - normal exam     Abdominal            Anesthesia Plan    ASA 2     general     The patient is not a current smoker.  Education provided regarding risk of obstructive sleep apnea.  intravenous induction   Anesthetic plan and risks discussed with patient.    Plan discussed with CAA, attending and CRNA.

## 2024-01-08 NOTE — ANESTHESIA PROCEDURE NOTES
Airway  Date/Time: 1/8/2024 9:06 AM  Urgency: elective    Airway not difficult    Staffing  Performed: CRNA   Authorized by: Pola Scott MD    Performed by: MINERVA Mckee-OLINDA  Patient location during procedure: OR    Indications and Patient Condition  Indications for airway management: anesthesia  Spontaneous ventilation: present  Sedation level: deep  Preoxygenated: yes  Patient position: sniffing  Mask difficulty assessment: 1 - vent by mask    Final Airway Details  Final airway type: supraglottic airway      Successful airway: Size 4     Number of attempts at approach: 1  Ventilation between attempts: none  Number of other approaches attempted: 0

## 2024-01-08 NOTE — OP NOTE
LEEP Operative Note     Date: 2024  OR Location: [facility]    Name: Zuleika Mendoza : 2000, Age: 23 y.o., MRN: [id number], Sex: female    Diagnosis  Pre-op Diagnosis     * HGSIL (high grade squamous intraepithelial lesion) on Pap smear of cervix [R87.613] Post-op Diagnosis     * HGSIL (high grade squamous intraepithelial lesion) on Pap smear of cervix [R87.613]     Procedures  LEEP  77139 - OR CONIZATION CERVIX W/WO D&C RPR ELTRD EXC      Surgeons      * Efren Alvarado - Primary    Resident/Fellow/Other Assistant:  Surgeon(s) and Role:    Procedure Summary  Anesthesia: General  ASA: II  Anesthesia Staff: Anesthesiologist: Pola Scott MD  CRNA: MINERVA Mckee-CRNA  Estimated Blood Loss: 2mL  Intra-op Medications:   Medication Name Total Dose   lidocaine-epinephrine (Xylocaine W/EPI) 1 %-1:100,000 injection 10 mL              Anesthesia Record               Intraprocedure I/O Totals       None           Specimen:   ID Type Source Tests Collected by Time   1 : leep biopsy, suture at 12 o'clock Tissue CERVIX LEEP SURGICAL PATHOLOGY EXAM Efren Alvarado MD 2024 0915        Staff:   Circulator: Stefania Millan RN  Scrub Person: Terell Amaro RN         Drains and/or Catheters: * None in log *    Tourniquet Times:         Implants:     Findings: Normal appearing cervix    Indications: Zuleika Mendoza is an 23 y.o. female who is having surgery for HGSIL (high grade squamous intraepithelial lesion) on Pap smear of cervix [R87.613].     The patient was seen in the preoperative area. The risks, benefits, complications, treatment options, non-operative alternatives, expected recovery and outcomes were discussed with the patient. The possibilities of reaction to medication, pulmonary aspiration, injury to surrounding structures, bleeding, recurrent infection, the need for additional procedures, failure to diagnose a condition, and creating a complication requiring transfusion or operation were discussed with  the patient. The patient concurred with the proposed plan, giving informed consent.  The site of surgery was properly noted/marked if necessary per policy. The patient has been actively warmed in preoperative area. Preoperative antibiotics are not indicated. Venous thrombosis prophylaxis have been ordered including bilateral sequential compression devices    Procedure Details: Patient was taken into the operative suite and after given adequate general LMA anesthesia was placed in the dorsolithotomy position and prepped and draped in usual sterile fashion.  Coated speculum was placed into the vagina and the anterior lip of the cervix was grasped with a single-tooth tenaculum.  The cervix was then injected with 10 cc of 1% lidocaine with 1:100,000 dilution epinephrine.  Once this was completed a medium size loop was then chosen for the procedure.  With cautery set at 6060 a LEEP cone was excised off the cervix.  Specimen was tagged at 12:00.  The bed of the defect was then cauterized with ball cautery.  There was no active bleeding.  The cervix was examined for approximately 5 minutes without tension.  There was no active bleeding again.  Patient tolerated the procedure well.  There were no complications she was taken recovery room in stable condition.  Complications:  None; patient tolerated the procedure well.    Disposition: PACU - hemodynamically stable.  Condition: stable         Additional Details: none    Attending Attestation: I performed the procedure.    Efren Alvarado  Phone Number: [phone]

## 2024-01-16 LAB
LABORATORY COMMENT REPORT: NORMAL
PATH REPORT.COMMENTS IMP SPEC: NORMAL
PATH REPORT.FINAL DX SPEC: NORMAL
PATH REPORT.GROSS SPEC: NORMAL
PATH REPORT.RELEVANT HX SPEC: NORMAL
PATH REPORT.TOTAL CANCER: NORMAL
PATHOLOGY SYNOPTIC REPORT: NORMAL

## 2024-01-17 DIAGNOSIS — C53.9: Primary | ICD-10-CM

## 2024-01-17 NOTE — PROGRESS NOTES
Spoke to patient on the phone.  Informed her of recently procedure with invasive squamous cell carcinoma.  Discussed pathology at length with patient.  Will refer to GYN oncology.  Spoke to Dr. Whitfield's office and they will reach out to patient to help her make an appointment.

## 2024-01-19 LAB
ATRIAL RATE: 72 BPM
P AXIS: 38 DEGREES
P OFFSET: 187 MS
P ONSET: 140 MS
PR INTERVAL: 142 MS
Q ONSET: 211 MS
QRS COUNT: 12 BEATS
QRS DURATION: 80 MS
QT INTERVAL: 386 MS
QTC CALCULATION(BAZETT): 422 MS
QTC FREDERICIA: 410 MS
R AXIS: 82 DEGREES
T AXIS: 17 DEGREES
T OFFSET: 404 MS
VENTRICULAR RATE: 72 BPM

## 2024-01-23 ENCOUNTER — TELEPHONE (OUTPATIENT)
Dept: OBSTETRICS AND GYNECOLOGY | Facility: CLINIC | Age: 24
End: 2024-01-23
Payer: COMMERCIAL

## 2024-01-26 ENCOUNTER — APPOINTMENT (OUTPATIENT)
Dept: OBSTETRICS AND GYNECOLOGY | Facility: CLINIC | Age: 24
End: 2024-01-26
Payer: COMMERCIAL

## 2024-01-30 ENCOUNTER — OFFICE VISIT (OUTPATIENT)
Dept: GYNECOLOGIC ONCOLOGY | Facility: HOSPITAL | Age: 24
End: 2024-01-30
Payer: COMMERCIAL

## 2024-01-30 VITALS
TEMPERATURE: 96.6 F | HEART RATE: 76 BPM | SYSTOLIC BLOOD PRESSURE: 128 MMHG | WEIGHT: 261.1 LBS | DIASTOLIC BLOOD PRESSURE: 85 MMHG | BODY MASS INDEX: 43.45 KG/M2

## 2024-01-30 DIAGNOSIS — C53.9: ICD-10-CM

## 2024-01-30 PROCEDURE — 1036F TOBACCO NON-USER: CPT | Performed by: OBSTETRICS & GYNECOLOGY

## 2024-01-30 PROCEDURE — 3008F BODY MASS INDEX DOCD: CPT | Performed by: OBSTETRICS & GYNECOLOGY

## 2024-01-30 PROCEDURE — 99214 OFFICE O/P EST MOD 30 MIN: CPT | Performed by: OBSTETRICS & GYNECOLOGY

## 2024-01-30 PROCEDURE — 99204 OFFICE O/P NEW MOD 45 MIN: CPT | Performed by: OBSTETRICS & GYNECOLOGY

## 2024-01-30 ASSESSMENT — ENCOUNTER SYMPTOMS
DEPRESSION: 0
LOSS OF SENSATION IN FEET: 0
OCCASIONAL FEELINGS OF UNSTEADINESS: 0

## 2024-01-30 ASSESSMENT — PATIENT HEALTH QUESTIONNAIRE - PHQ9
SUM OF ALL RESPONSES TO PHQ9 QUESTIONS 1 AND 2: 0
2. FEELING DOWN, DEPRESSED OR HOPELESS: NOT AT ALL
1. LITTLE INTEREST OR PLEASURE IN DOING THINGS: NOT AT ALL

## 2024-01-30 ASSESSMENT — COLUMBIA-SUICIDE SEVERITY RATING SCALE - C-SSRS
2. HAVE YOU ACTUALLY HAD ANY THOUGHTS OF KILLING YOURSELF?: NO
1. IN THE PAST MONTH, HAVE YOU WISHED YOU WERE DEAD OR WISHED YOU COULD GO TO SLEEP AND NOT WAKE UP?: NO
6. HAVE YOU EVER DONE ANYTHING, STARTED TO DO ANYTHING, OR PREPARED TO DO ANYTHING TO END YOUR LIFE?: NO

## 2024-01-30 NOTE — PROGRESS NOTES
Patient ID: Zuleika Mendoza is a 23 y.o. female.  Referring Physician: Efren Alvarado MD  [address], [place] [address]  Primary Care Provider: No Assigned PCP Generic Provider, MD Andres    22 yo with cervical cancer    Reports 3 abnormal PAP Hx over 2-3 years, she has MS and started Ocrevus infusions 6 months ago and they paused them, she had optic neuritis, vision loss in bilateral eyes, dizziness, possibly desires future fertility. Her menses are regular and heavy for 2 years, not using contraception, bowel movements, bladder and appetite are normal.    Cancer history  LEEP procedure   - path reported as Squamous cell carcinoma moderately differentiated arising in a background of CIN3 with extensive endocervical gland involvement all margins were negative no LVSI greatest dimension of the tumor was 0.15mm    PMH  MS    PSH  LEEP  Surgical      Ob/Gyn      SH  Drinks alcohol rarely denies tobacco or illicit drug use.   Lives with her parents  Works at a [employer]  No FH of malignancy        Review of Systems   All other systems reviewed and are negative.       Objective   BSA: There is no height or weight on file to calculate BSA.  LMP 2023 (Approximate)      Family History   Problem Relation Name Age of Onset    Hip dysplasia Mother      Anemia Mother      Migraines Mother      Migraines Father         Zuleika Mendoza  reports that she has never smoked. She has never used smokeless tobacco.  She  reports current alcohol use of about 2.0 standard drinks of alcohol per week.  She  reports no history of drug use.    Physical Exam  Constitutional:       General: She is not in acute distress.     Appearance: Normal appearance.   Cardiovascular:      Rate and Rhythm: Normal rate and regular rhythm.   Pulmonary:      Effort: Pulmonary effort is normal.      Breath sounds: Normal breath sounds.   Abdominal:      General: Bowel sounds are normal. There is  no distension.   Genitourinary:     Comments: Pelvic exam: LEEP biopsy bed is healing well, vaginal mucosa is normal, uterus is small and mobile, no evidence for parametrial extension on bimanual or rectovaginal exam.  Musculoskeletal:      Right forearm: Normal.      Left forearm: Normal.      Right hand: Normal.      Left hand: Normal.      Right lower leg: Normal.      Left lower leg: Normal.      Right foot: Normal.      Left foot: Normal.         Performance Status:  Asymptomatic    Assessment/Plan      Oncology History    No history exists.          Problem List Items Addressed This Visit             ICD-10-CM    Cancer determined by uterine cervix biopsy (CMS/[facility]) C53.9    Relevant Orders    Tumor Board Request    MR pelvis w IV contrast       Treatment Plans       No treatment plans exist         - Performed pelvic examination in office today, no obvious residual tumor  - Reviewed surgical pathology with the patient which showed and Squamous cell carcinoma that was removed in her LEEP procedure,.  Discussed fertility sparing and non-fertility sparing approaches.  Patient desires to maintain fertility.  - Plan to obtain MRI pelvis.  Discuss path at TB.  Follow up to discuss TB recommendations, close surveillance vs. Repeat excision.        Scribe Attestation  By signing my name below, Amanda MULTANI Scribe   attest that this documentation has been prepared under the direction and in the presence of Danelle Whitfield MD.

## 2024-02-01 NOTE — TUMOR BOARD NOTE
Gynecologic Oncology Tumor Board 2024         Zuleika Mendoza  23 y.o.    2000  MRN [id number]    Provider: Danelle Whitfield MD  Disease Site: Cervical  Pathology: IA1 SCC cervix, G2 - LVI margin neg   rdGrdrrdarddrderd:rd rd3rd Stage: IA1    We reviewed previous medical and familial history, history of present illness, and recent lab results along with all available histopathologic and imaging studies. The tumor board considered available treatment options and made the following recommendations.    Recommendations:     follow up MRI, JUANI referral, consider re-excision given close margins of HERNÁN-3 versus surveillance          National site-specific guidelines were discussed with respect to the case. It is recognized that there may be additional factors not discussed in the Review Board discussion that can influence management decisions, and alternative management options which fall within the standard of care. Accordingly the final treatment disposition will be determined by the patient, in the context of an informed discussion with their providers. The actual prescribed management or treatment plan may or may not be consistent with the Review Board recommendations.

## 2024-02-02 ENCOUNTER — TUMOR BOARD CONFERENCE (OUTPATIENT)
Dept: HEMATOLOGY/ONCOLOGY | Facility: HOSPITAL | Age: 24
End: 2024-02-02
Payer: COMMERCIAL

## 2024-02-05 ENCOUNTER — OFFICE VISIT (OUTPATIENT)
Dept: OBSTETRICS AND GYNECOLOGY | Facility: CLINIC | Age: 24
End: 2024-02-05
Payer: COMMERCIAL

## 2024-02-05 VITALS
BODY MASS INDEX: 42.87 KG/M2 | WEIGHT: 257.3 LBS | SYSTOLIC BLOOD PRESSURE: 120 MMHG | DIASTOLIC BLOOD PRESSURE: 81 MMHG | HEIGHT: 65 IN

## 2024-02-05 DIAGNOSIS — C53.9: Primary | ICD-10-CM

## 2024-02-05 PROCEDURE — 3008F BODY MASS INDEX DOCD: CPT | Performed by: OBSTETRICS & GYNECOLOGY

## 2024-02-05 PROCEDURE — 99213 OFFICE O/P EST LOW 20 MIN: CPT | Performed by: OBSTETRICS & GYNECOLOGY

## 2024-02-05 PROCEDURE — 1036F TOBACCO NON-USER: CPT | Performed by: OBSTETRICS & GYNECOLOGY

## 2024-02-05 NOTE — PROGRESS NOTES
Subjective   Patient ID: Zuleika Mendoza is a 23 y.o. female who presents for Post-op (Patient here for post op--[id number]/invasive CA/Mother in room as chaperone).  HPI  Presents for postop check.    Status post LEEP procedure January 8, 2024.  Pathology did reveal squamous cell carcinoma moderately differentiated in background of HERNÁN-3.  Negative margins.  Patient did have appointment with GYN oncology.  Case was presented to tumor board.  Patient is going to have MRI.  Further management based on MRI results.  Since surgery patient states she has been doing well she did have menstrual cycle which was a little bit heavier.  And some spotty bleeding.  Currently without pain  Review of Systems    Objective   Physical Exam  Pelvic: External genitalia normal, vagina normal rugae good tone cervix is well-healed no cervical motion tenderness.  Assessment/Plan   Normal postop check, appreciate GYN oncology consult, pending recs.  Follow-up in 3 months for Pap smear.         Efren Alvarado MD 02/05/24 12:21 PM

## 2024-02-06 ENCOUNTER — TELEPHONE (OUTPATIENT)
Dept: GYNECOLOGIC ONCOLOGY | Facility: HOSPITAL | Age: 24
End: 2024-02-06
Payer: COMMERCIAL

## 2024-02-06 NOTE — TELEPHONE ENCOUNTER
Received call from KIAN Yanez with patients neurology office, Dr. Corcoran,  to update that Zuleika is currently receiving  Ocrevus infusions for treatment of MS.  Zuleika receives Ocrevus 2 times/year.    Last infusion was August and she was scheduled for infusion 1/23 but was held due to recent procedure and pathology findings.   CKC with Dr. Alvarado.       Dr. Mtz office asked that Dr. Whitfield updated you regarding above and would like an update as to final treatment recommendation in order to resume Ocrevus in a safe time frame.     Nurse provided me with her as well as the office PA's contact information so I can keep them updated.        PA:  Jessica:  [phone]    Message routed to Dr. Whitfield.     Dr. Whitfield responded stated plan will likely be for close surveillance and patient will likely be able to resume Ocrevus.     Dr. Whitfield will update once MRI of pelvis results are reviewed.

## 2024-02-15 ENCOUNTER — APPOINTMENT (OUTPATIENT)
Dept: RADIOLOGY | Facility: CLINIC | Age: 24
End: 2024-02-15
Payer: COMMERCIAL

## 2024-02-15 ENCOUNTER — HOSPITAL ENCOUNTER (OUTPATIENT)
Dept: RADIOLOGY | Facility: HOSPITAL | Age: 24
Discharge: HOME | End: 2024-02-15
Payer: COMMERCIAL

## 2024-02-15 DIAGNOSIS — C53.9: ICD-10-CM

## 2024-02-15 PROCEDURE — 72197 MRI PELVIS W/O & W/DYE: CPT

## 2024-02-15 PROCEDURE — 2550000001 HC RX 255 CONTRASTS: Performed by: OBSTETRICS & GYNECOLOGY

## 2024-02-15 PROCEDURE — A9575 INJ GADOTERATE MEGLUMI 0.1ML: HCPCS | Performed by: OBSTETRICS & GYNECOLOGY

## 2024-02-15 PROCEDURE — 72197 MRI PELVIS W/O & W/DYE: CPT | Performed by: RADIOLOGY

## 2024-02-15 RX ORDER — GADOTERATE MEGLUMINE 376.9 MG/ML
20 INJECTION INTRAVENOUS
Status: COMPLETED | OUTPATIENT
Start: 2024-02-15 | End: 2024-02-15

## 2024-02-15 RX ADMIN — GADOTERATE MEGLUMINE 20 ML: 376.9 INJECTION INTRAVENOUS at 16:50

## 2024-02-22 ENCOUNTER — TELEPHONE (OUTPATIENT)
Dept: GYNECOLOGIC ONCOLOGY | Facility: HOSPITAL | Age: 24
End: 2024-02-22
Payer: COMMERCIAL

## 2024-02-22 NOTE — TELEPHONE ENCOUNTER
Patient called requesting MRI results.    Phoned patient to notify her that MRI results show no residual disease and plan is for close observation with repeat pap in 6 months.   Notified patient she is clear to resume Ocrevus infusions.    Phoned patients nurse coordinator , Renata Cleveland to notify that patient may resume Ocrevus infusions, message left at number .     Advised patient to keep appointment with Dr. Whitfield as scheduled on 3/5/24.

## 2024-03-05 ENCOUNTER — OFFICE VISIT (OUTPATIENT)
Dept: GYNECOLOGIC ONCOLOGY | Facility: HOSPITAL | Age: 24
End: 2024-03-05
Payer: COMMERCIAL

## 2024-03-05 VITALS
BODY MASS INDEX: 42.8 KG/M2 | OXYGEN SATURATION: 97 % | DIASTOLIC BLOOD PRESSURE: 77 MMHG | HEART RATE: 75 BPM | WEIGHT: 257.2 LBS | TEMPERATURE: 96.3 F | RESPIRATION RATE: 18 BRPM | SYSTOLIC BLOOD PRESSURE: 117 MMHG

## 2024-03-05 DIAGNOSIS — C53.9 MALIGNANT NEOPLASM OF CERVIX, UNSPECIFIED SITE (MULTI): Primary | ICD-10-CM

## 2024-03-05 PROCEDURE — 3008F BODY MASS INDEX DOCD: CPT | Performed by: OBSTETRICS & GYNECOLOGY

## 2024-03-05 PROCEDURE — 99213 OFFICE O/P EST LOW 20 MIN: CPT | Performed by: OBSTETRICS & GYNECOLOGY

## 2024-03-05 PROCEDURE — 1036F TOBACCO NON-USER: CPT | Performed by: OBSTETRICS & GYNECOLOGY

## 2024-03-05 ASSESSMENT — PAIN SCALES - GENERAL: PAINLEVEL: 0-NO PAIN

## 2024-03-05 NOTE — PROGRESS NOTES
Patient ID: Zuleika Mendoza is a 23 y.o. female.  Referring Physician: No referring provider defined for this encounter.  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   Lives with her parents  Works at a [employer]     FH:  No FH of malignancy    Objective    BSA: 2.32 meters squared  /77   Pulse 75   Temp 35.7 °C (96.3 °F)   Resp 18   Wt 117 kg (257 lb 3.2 oz)   LMP 01/10/2024 (Approximate)   SpO2 97%   BMI 42.80 kg/m²      Physical Exam  Constitutional:       Appearance: Normal appearance. She is normal weight.   Cardiovascular:      Rate and Rhythm: Normal rate and regular rhythm.   Pulmonary:      Effort: Pulmonary effort is normal.      Breath sounds: Normal breath sounds.   Abdominal:      General: Abdomen is flat. Bowel sounds are normal.      Palpations: Abdomen is soft.   Musculoskeletal:         General: Normal range of motion.      Cervical back: Normal range of motion.   Skin:     General: Skin is warm and dry.      Capillary Refill: Capillary refill takes less than 2 seconds.   Neurological:      General: No focal deficit present.      Mental Status: She is alert and oriented to person, place, and time. Mental status is at baseline.   Psychiatric:         Mood and Affect: Mood normal.         Behavior: Behavior normal.         Thought Content: Thought content normal.         Judgment: Judgment normal.         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    -We discussed her diagnosis and the significance of stage, LVSI, margins, MRI findings.  We again revisited the options for stage IA1 cervical cancer in regard to both fertility sparing and non-fertility sparing options.  -We review tumor board recommendations  -She would like to proceed with fertility sparing options   -We reviewed surveillance versus repeat excision.  Given close margin of HERNÁN-3 and the fact that she will be on immunosuppressive medications for her MS, we agreed to pursue repeat excision.  Will schedule for CKC, order placed.  -Would like to receive guardasil vaccine    Chuck Barkley MD  Seen with Dr. Whitfield

## 2024-03-06 PROBLEM — C53.9 MALIGNANT NEOPLASM OF CERVIX (MULTI): Status: ACTIVE | Noted: 2024-03-05

## 2024-03-14 ASSESSMENT — ENCOUNTER SYMPTOMS: MYALGIAS: 1

## 2024-03-14 NOTE — PREPROCEDURE INSTRUCTIONS
Pre-Op Instructions & Checklist  Your surgery has been scheduled at [facility] at [address] [place], [address], [address], in the [facility]. Parking is to the left of the main entrance.  You will be contacted about the time of your surgery the day before your surgery. If you are unable to answer the phone, a detailed voicemail message will be left. Make sure that your voicemail box is not full so a message can be left. If you have not received a call by 3:00 pm you may call [phone] between the hours of 3:00 and 4:00 pm. Please be available by phone the night before/day of surgery in case there is a change in the schedule which may require you to arrive earlier/later.  14 DAYS BEFORE SURGERY STOP TAKING WEIGHT LOSS MEDICATIONS     7 DAYS BEFORE SURGERY STOP THESE MEDICATIONS:  Multiple Vitamins containing Vitamin E  Herbal supplements, Fish Oil, garlic pills, turmeric, CoQ enzyme  Stop taking aspirin, and aspirin-containing products as well as NSAID's such as Advil, Motrin, Aleve, Ibuprofen. Tylenol is okay to take for pain relief.   If you are currently taking Coumadin/Warfarin, we will have to coordinate that with your PCP &/or the Anticoagulation Clinic.    THE DAY BEFORE SURGERY:  *Do not eat any food after midnight the night before surgery.   *You are permitted to have clear liquids such as water, apple juice, plain tea or coffee (no milk or creamer), clear electrolyte-replenishing drinks such as Pedialyte, Gatorade, or Powerade (not yogurt or pulp-containing smoothies or juices such as orange juice) up to 2 hours before your surgery.  DAY OF SURGERY, TAKE THESE MEDICATIONS with a small sip of water (if it is not listed, do not take it):    There are no medications for you to take the morning of surgery.      ON THE MORNING OF SURGERY:  *Shower either the night before your surgery or the morning of your surgery  *Do not use moisturizers, creams, lotions or perfume, or  make-up.  *Wear comfortable, loose fitting clothing.   *All jewelry and valuables should be left at home.  *Prosthetic devices such as contact lenses, hearing aids, dentures, eyelash extensions, hairpins and body piercing must be removed before surgery. Bring containers for eyeglasses/contacts, dentures, or hearing aids with you.  Diabetics: Please check fasting blood sugars upon waking up.  If fasting blood sugars are<80ml/dl, please drink 3 ounces of apple juice no later than 2 hours prior to surgery.    BRING WITH YOU:  *Photo ID and insurance card  *Current list of medicines and allergies  *Pacemaker/Defibrillator/Heart stent cards  *Copy of your complete Advanced Directive/DHPOA-if applicable    SMOKING:  *Quitting smoking can make a huge difference to your health and recovery from surgery.    *If you need help with quitting, call [phone]-QUIT-NOW.  Alcohol:  *No alcoholic beverages for 48 hours before surgery.    AFTER OUTPATIENT SURGERY:  *A responsible adult MUST accompany you at the time of discharge and stay with you for 24 hours after your surgery.  *You may NOT drive yourself home after surgery.  *You may use a taxi or ride sharing service (Lyft, Uber) to return home ONLY if you are accompanied by a friend or family member.  *Instructions for resuming your medications will be provided by your surgeon.    CONTACT SURGEON'S OFFICE IF YOU DEVELOP:  * Fever =/> 100.4 F   * New respiratory symptoms (e.g. cough, shortness of breath, respiratory distress, sore throat)  * Recent loss of taste or smell  *Flu like symptoms such as headache, fatigue or gastrointestinal symptoms  * If you develop any open sores, shingles, burning or painful urination   AND/OR:  * You no longer wish to have the surgery.  * Any other personal circumstances change that may lead to the need to cancel or defer this surgery.  *You were admitted to any hospital within one week of your planned procedure.      If you have any questions  regarding these preoperative instructions you may call [phone]. If you have questions regarding you surgical procedure, or post-operative care/recovery please call your surgeon's office.

## 2024-03-14 NOTE — CPM/PAT H&P
CPM/PAT Evaluation       Name: Zuleika Mendoza (Zuleika Mendoza)  /Age: 2000/23 y.o.   TELEMEDICINE ENCOUNTER  Patient was contacted by telephone for preadmission testing perioperative risk assessment prior to surgery.    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 She underwent a LEEP procedure  in 2024 at [facility].  Tumor Board recommendations were for follow up MRI, JUANI referral, consider re-excision given close margins of HERNÁN-3 versus surveillance. Since patient is on immunosuppressive medications for her MS, she is in agreement to pursue repeat excision as opposed to continued surveillance.  She is now scheduled for lesion excision/biopsies of cervix on 2024.      ACTIVE PROBLEMS  Patient Active Problem List   Diagnosis    Optic neuritis, left    Vaginal bleeding in pregnancy    HGSIL (high grade squamous intraepithelial lesion) on Pap smear of cervix    Cancer determined by uterine cervix biopsy (CMS/HCC)    Malignant neoplasm of cervix (CMS/HCC)     PAST MEDICAL HISTORY  Past Medical History:   Diagnosis Date    Immunocompromised (CMS/HCC)     Migraines     Multiple sclerosis (CMS/HCC)      SURGICAL HISTORY  Past Surgical History:   Procedure Laterality Date    CERVICAL BIOPSY  W/ LOOP ELECTRODE EXCISION  2024    OTHER SURGICAL HISTORY  2021    Surgically induced      ANESTHESIA HISTORY  Denies problems with anesthesia in the past such as PONV, prolonged sedation, awareness, dental damage, aspiration, cardiac arrest, difficult intubation, or unexpected hospital admissions.  Denies family history of malignant hyperthermia, or pseudocholinesterase deficiency.    SOCIAL HISTORY  Never smoker; EtOH: About 2 glasses of wine a week; denies recreational drug use.  Patient states she is able to do moderate ADLs such as heavy housework (vacuuming,  scrubbing floors, carrying heavy laundry baskets/objects up and down stairs).  She denies chest pain, SANTORO.  METS 4    FAMILY HISTORY  Family History   Problem Relation Name Age of Onset    Hip dysplasia Mother      Anemia Mother      Migraines Mother      Migraines Father       ALLERGIES  Allergies   Allergen Reactions    Amoxicillin Rash    Penicillins Rash     MEDICATIONS  No current facility-administered medications for this encounter.    Current Outpatient Medications:     ascorbic acid (Vitamin C) 500 mg tablet, Take 1 tablet (500 mg) by mouth once daily., Disp: , Rfl:     calcium citrate-vitamin D2 250 mg-2.5 mcg (100 unit) tablet, Take 1 tablet by mouth 2 times a day., Disp: , Rfl:     ocrelizumab 600 mg in sodium chloride 0.9% 480 mL IV, Infuse 600 mg into a venous catheter 1 time. New onset multiple sclerosis, Disp: , Rfl:     Review of Systems   Genitourinary:         Malignant neoplasm of cervix   Musculoskeletal:  Positive for myalgias (Multiple sclerosis).   All other systems reviewed and are negative.    PHYSICAL EXAM  Deferred    AIRWAY EXAM  Deferred    VITALS  No vitals taken for telemedicine visit  Height: 5 feet 5 inches; weight: 257 pounds; BMI: 42.80    LABS  Lab Results   Component Value Date    WBC 11.2 01/03/2024    HGB 14.3 01/03/2024    HCT 41.2 01/03/2024    MCV 93 01/03/2024     01/03/2024     Lab Results   Component Value Date    GLUCOSE 80 01/03/2024    CALCIUM 9.5 01/03/2024     01/03/2024    K 4.4 01/03/2024    CO2 25 01/03/2024     01/03/2024    BUN 10 01/03/2024    CREATININE 0.64 01/03/2024     IMAGING  EKG from 12/29/2023  Indications  Priority: Routine  Dx: Pre-op testing [Z01.818 (ICD-10-CM)]     Interpretation Summary    Normal sinus rhythm with sinus arrhythmia  Normal ECG  No previous ECGs available  Confirmed by Beck Solis [id number]) on 1/19/2024 9:16:29 AM      ASSESSMENT/PLAN  Malignant neoplasm of cervix  Lesion excision/biopsy of cervix      This  note was created in part upon personal review of patient's medical records.  Speech recognition transcription software was used in the creation of this note. Despite proofreading, several typographical errors might be present that might affect the meaning of the content.

## 2024-03-14 NOTE — H&P (VIEW-ONLY)
CPM/PAT Evaluation       Name: Zuleika Mendoza (Zuleika Mendoza)  /Age: 2000/23 y.o.   TELEMEDICINE ENCOUNTER  Patient was contacted by telephone for preadmission testing perioperative risk assessment prior to surgery.    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 She underwent a LEEP procedure  in 2024 at [facility].  Tumor Board recommendations were for follow up MRI, JUANI referral, consider re-excision given close margins of HERNÁN-3 versus surveillance. Since patient is on immunosuppressive medications for her MS, she is in agreement to pursue repeat excision as opposed to continued surveillance.  She is now scheduled for lesion excision/biopsies of cervix on 2024.      ACTIVE PROBLEMS  Patient Active Problem List   Diagnosis    Optic neuritis, left    Vaginal bleeding in pregnancy    HGSIL (high grade squamous intraepithelial lesion) on Pap smear of cervix    Cancer determined by uterine cervix biopsy (CMS/HCC)    Malignant neoplasm of cervix (CMS/HCC)     PAST MEDICAL HISTORY  Past Medical History:   Diagnosis Date    Immunocompromised (CMS/HCC)     Migraines     Multiple sclerosis (CMS/HCC)      SURGICAL HISTORY  Past Surgical History:   Procedure Laterality Date    CERVICAL BIOPSY  W/ LOOP ELECTRODE EXCISION  2024    OTHER SURGICAL HISTORY  2021    Surgically induced      ANESTHESIA HISTORY  Denies problems with anesthesia in the past such as PONV, prolonged sedation, awareness, dental damage, aspiration, cardiac arrest, difficult intubation, or unexpected hospital admissions.  Denies family history of malignant hyperthermia, or pseudocholinesterase deficiency.    SOCIAL HISTORY  Never smoker; EtOH: About 2 glasses of wine a week; denies recreational drug use.  Patient states she is able to do moderate ADLs such as heavy housework (vacuuming,  scrubbing floors, carrying heavy laundry baskets/objects up and down stairs).  She denies chest pain, SANTORO.  METS 4    FAMILY HISTORY  Family History   Problem Relation Name Age of Onset    Hip dysplasia Mother      Anemia Mother      Migraines Mother      Migraines Father       ALLERGIES  Allergies   Allergen Reactions    Amoxicillin Rash    Penicillins Rash     MEDICATIONS  No current facility-administered medications for this encounter.    Current Outpatient Medications:     ascorbic acid (Vitamin C) 500 mg tablet, Take 1 tablet (500 mg) by mouth once daily., Disp: , Rfl:     calcium citrate-vitamin D2 250 mg-2.5 mcg (100 unit) tablet, Take 1 tablet by mouth 2 times a day., Disp: , Rfl:     ocrelizumab 600 mg in sodium chloride 0.9% 480 mL IV, Infuse 600 mg into a venous catheter 1 time. New onset multiple sclerosis, Disp: , Rfl:     Review of Systems   Genitourinary:         Malignant neoplasm of cervix   Musculoskeletal:  Positive for myalgias (Multiple sclerosis).   All other systems reviewed and are negative.    PHYSICAL EXAM  Deferred    AIRWAY EXAM  Deferred    VITALS  No vitals taken for telemedicine visit  Height: 5 feet 5 inches; weight: 257 pounds; BMI: 42.80    LABS  Lab Results   Component Value Date    WBC 11.2 01/03/2024    HGB 14.3 01/03/2024    HCT 41.2 01/03/2024    MCV 93 01/03/2024     01/03/2024     Lab Results   Component Value Date    GLUCOSE 80 01/03/2024    CALCIUM 9.5 01/03/2024     01/03/2024    K 4.4 01/03/2024    CO2 25 01/03/2024     01/03/2024    BUN 10 01/03/2024    CREATININE 0.64 01/03/2024     IMAGING  EKG from 12/29/2023  Indications  Priority: Routine  Dx: Pre-op testing [Z01.818 (ICD-10-CM)]     Interpretation Summary    Normal sinus rhythm with sinus arrhythmia  Normal ECG  No previous ECGs available  Confirmed by Beck Solis [id number]) on 1/19/2024 9:16:29 AM      ASSESSMENT/PLAN  Malignant neoplasm of cervix  Lesion excision/biopsy of cervix      This  note was created in part upon personal review of patient's medical records.  Speech recognition transcription software was used in the creation of this note. Despite proofreading, several typographical errors might be present that might affect the meaning of the content.

## 2024-03-26 ENCOUNTER — TELEPHONE (OUTPATIENT)
Dept: GYNECOLOGIC ONCOLOGY | Facility: HOSPITAL | Age: 24
End: 2024-03-26
Payer: COMMERCIAL

## 2024-03-26 NOTE — TELEPHONE ENCOUNTER
Patient sent [employer] message asking if it is ok to receive HPV vaccine prior to 4/3/24 CKC procedure.     Patient takes Ocrevus for treatment of MS and can receive vaccines within a specific window of time around Ocrevus infusions.       Dr. Whitfield updated and states it is ok for patient to receive HPV vaccine prior to 4/3/24 procedure.     Return [employer] message sent to patient to update and notify that HPV vaccine is not available in gyn/onc clinic setting but is available in SCC infusion area although copay would be higher than receiving HPV vaccine in PCP office, local pharmacy such as [facility], [facility], or the Health Department.

## 2024-04-02 ENCOUNTER — TELEPHONE (OUTPATIENT)
Dept: GYNECOLOGIC ONCOLOGY | Facility: HOSPITAL | Age: 24
End: 2024-04-02
Payer: COMMERCIAL

## 2024-04-02 ENCOUNTER — ANESTHESIA EVENT (OUTPATIENT)
Dept: OPERATING ROOM | Facility: CLINIC | Age: 24
End: 2024-04-02
Payer: COMMERCIAL

## 2024-04-02 NOTE — TELEPHONE ENCOUNTER
Patient called requesting work excuse letter.   Patient scheduled for CKC on 4/3/24.    Work excuse letter completed and emailed to patient at [email or web address]

## 2024-04-02 NOTE — DISCHARGE INSTRUCTIONS
After your procedure:  It is normal to experience some pelvic cramping and to have spotting. It would not be normal to have heavy bleeding (soaking through a large pad in an hour). Sometimes your doctor will use medicine on your cervix to help with bleeding. This medicine can cause discharge that looks like coffee grounds and can stain clothing. Please do not place anything in your vagina for 2 weeks.      Call [phone] if you have:  Call for any fever above 100.4 F (If you do not feel feverish you do not have to routinely check your temperature.)  Call for severe pain not improved by medications  Call for persistent nausea, vomiting  Difficulty breathing, headache or visual disturbances  Hives  Persistent dizziness or light-headedness  Extreme fatigue  Any other questions or concerns you may have after discharge    In an emergency, call 911 or go to an Emergency Department at a nearby hospital

## 2024-04-03 ENCOUNTER — ANESTHESIA (OUTPATIENT)
Dept: OPERATING ROOM | Facility: CLINIC | Age: 24
End: 2024-04-03
Payer: COMMERCIAL

## 2024-04-03 ENCOUNTER — HOSPITAL ENCOUNTER (OUTPATIENT)
Facility: CLINIC | Age: 24
Setting detail: OUTPATIENT SURGERY
Discharge: HOME | End: 2024-04-03
Attending: STUDENT IN AN ORGANIZED HEALTH CARE EDUCATION/TRAINING PROGRAM | Admitting: STUDENT IN AN ORGANIZED HEALTH CARE EDUCATION/TRAINING PROGRAM
Payer: COMMERCIAL

## 2024-04-03 VITALS
OXYGEN SATURATION: 98 % | RESPIRATION RATE: 16 BRPM | BODY MASS INDEX: 42.75 KG/M2 | SYSTOLIC BLOOD PRESSURE: 130 MMHG | HEART RATE: 74 BPM | DIASTOLIC BLOOD PRESSURE: 73 MMHG | TEMPERATURE: 97.3 F | WEIGHT: 256.62 LBS | HEIGHT: 65 IN

## 2024-04-03 DIAGNOSIS — C53.9 MALIGNANT NEOPLASM OF CERVIX, UNSPECIFIED SITE (MULTI): ICD-10-CM

## 2024-04-03 PROBLEM — G35 MULTIPLE SCLEROSIS (MULTI): Status: ACTIVE | Noted: 2024-04-03

## 2024-04-03 PROBLEM — E66.9 OBESITY: Status: ACTIVE | Noted: 2024-04-03

## 2024-04-03 LAB — PREGNANCY TEST URINE, POC: NEGATIVE

## 2024-04-03 PROCEDURE — 3600000003 HC OR TIME - INITIAL BASE CHARGE - PROCEDURE LEVEL THREE: Performed by: STUDENT IN AN ORGANIZED HEALTH CARE EDUCATION/TRAINING PROGRAM

## 2024-04-03 PROCEDURE — 7100000009 HC PHASE TWO TIME - INITIAL BASE CHARGE: Performed by: STUDENT IN AN ORGANIZED HEALTH CARE EDUCATION/TRAINING PROGRAM

## 2024-04-03 PROCEDURE — A4217 STERILE WATER/SALINE, 500 ML: HCPCS | Performed by: STUDENT IN AN ORGANIZED HEALTH CARE EDUCATION/TRAINING PROGRAM

## 2024-04-03 PROCEDURE — 88305 TISSUE EXAM BY PATHOLOGIST: CPT | Performed by: PATHOLOGY

## 2024-04-03 PROCEDURE — 2500000004 HC RX 250 GENERAL PHARMACY W/ HCPCS (ALT 636 FOR OP/ED)

## 2024-04-03 PROCEDURE — 88307 TISSUE EXAM BY PATHOLOGIST: CPT | Mod: TC,SUR | Performed by: STUDENT IN AN ORGANIZED HEALTH CARE EDUCATION/TRAINING PROGRAM

## 2024-04-03 PROCEDURE — 88307 TISSUE EXAM BY PATHOLOGIST: CPT | Performed by: PATHOLOGY

## 2024-04-03 PROCEDURE — 3600000008 HC OR TIME - EACH INCREMENTAL 1 MINUTE - PROCEDURE LEVEL THREE: Performed by: STUDENT IN AN ORGANIZED HEALTH CARE EDUCATION/TRAINING PROGRAM

## 2024-04-03 PROCEDURE — 7100000010 HC PHASE TWO TIME - EACH INCREMENTAL 1 MINUTE: Performed by: STUDENT IN AN ORGANIZED HEALTH CARE EDUCATION/TRAINING PROGRAM

## 2024-04-03 PROCEDURE — 81025 URINE PREGNANCY TEST: CPT | Performed by: STUDENT IN AN ORGANIZED HEALTH CARE EDUCATION/TRAINING PROGRAM

## 2024-04-03 PROCEDURE — 57520 CONIZATION OF CERVIX: CPT | Performed by: STUDENT IN AN ORGANIZED HEALTH CARE EDUCATION/TRAINING PROGRAM

## 2024-04-03 PROCEDURE — 2500000004 HC RX 250 GENERAL PHARMACY W/ HCPCS (ALT 636 FOR OP/ED): Performed by: ANESTHESIOLOGY

## 2024-04-03 PROCEDURE — 2500000004 HC RX 250 GENERAL PHARMACY W/ HCPCS (ALT 636 FOR OP/ED): Performed by: STUDENT IN AN ORGANIZED HEALTH CARE EDUCATION/TRAINING PROGRAM

## 2024-04-03 PROCEDURE — 2500000001 HC RX 250 WO HCPCS SELF ADMINISTERED DRUGS (ALT 637 FOR MEDICARE OP): Performed by: STUDENT IN AN ORGANIZED HEALTH CARE EDUCATION/TRAINING PROGRAM

## 2024-04-03 PROCEDURE — 3700000002 HC GENERAL ANESTHESIA TIME - EACH INCREMENTAL 1 MINUTE: Performed by: STUDENT IN AN ORGANIZED HEALTH CARE EDUCATION/TRAINING PROGRAM

## 2024-04-03 PROCEDURE — 3700000001 HC GENERAL ANESTHESIA TIME - INITIAL BASE CHARGE: Performed by: STUDENT IN AN ORGANIZED HEALTH CARE EDUCATION/TRAINING PROGRAM

## 2024-04-03 PROCEDURE — 2500000005 HC RX 250 GENERAL PHARMACY W/O HCPCS: Performed by: STUDENT IN AN ORGANIZED HEALTH CARE EDUCATION/TRAINING PROGRAM

## 2024-04-03 PROCEDURE — 2500000005 HC RX 250 GENERAL PHARMACY W/O HCPCS

## 2024-04-03 PROCEDURE — A57500 PR BIOPSY CERVIX, 1 OR MORE, OR EXCISION OF LESION: Performed by: STUDENT IN AN ORGANIZED HEALTH CARE EDUCATION/TRAINING PROGRAM

## 2024-04-03 PROCEDURE — A57500 PR BIOPSY CERVIX, 1 OR MORE, OR EXCISION OF LESION

## 2024-04-03 RX ORDER — ONDANSETRON HYDROCHLORIDE 2 MG/ML
4 INJECTION, SOLUTION INTRAVENOUS ONCE AS NEEDED
Status: DISCONTINUED | OUTPATIENT
Start: 2024-04-03 | End: 2024-04-03 | Stop reason: HOSPADM

## 2024-04-03 RX ORDER — FENTANYL CITRATE 50 UG/ML
25 INJECTION, SOLUTION INTRAMUSCULAR; INTRAVENOUS EVERY 5 MIN PRN
Status: DISCONTINUED | OUTPATIENT
Start: 2024-04-03 | End: 2024-04-03 | Stop reason: HOSPADM

## 2024-04-03 RX ORDER — LABETALOL HYDROCHLORIDE 5 MG/ML
5 INJECTION, SOLUTION INTRAVENOUS ONCE AS NEEDED
Status: DISCONTINUED | OUTPATIENT
Start: 2024-04-03 | End: 2024-04-03 | Stop reason: HOSPADM

## 2024-04-03 RX ORDER — ONDANSETRON HYDROCHLORIDE 2 MG/ML
INJECTION, SOLUTION INTRAVENOUS AS NEEDED
Status: DISCONTINUED | OUTPATIENT
Start: 2024-04-03 | End: 2024-04-03

## 2024-04-03 RX ORDER — SODIUM CHLORIDE, SODIUM LACTATE, POTASSIUM CHLORIDE, CALCIUM CHLORIDE 600; 310; 30; 20 MG/100ML; MG/100ML; MG/100ML; MG/100ML
100 INJECTION, SOLUTION INTRAVENOUS CONTINUOUS
Status: DISCONTINUED | OUTPATIENT
Start: 2024-04-03 | End: 2024-04-03 | Stop reason: HOSPADM

## 2024-04-03 RX ORDER — SODIUM CHLORIDE 0.9 G/100ML
IRRIGANT IRRIGATION AS NEEDED
Status: DISCONTINUED | OUTPATIENT
Start: 2024-04-03 | End: 2024-04-03 | Stop reason: HOSPADM

## 2024-04-03 RX ORDER — ACETAMINOPHEN 325 MG/1
650 TABLET ORAL EVERY 4 HOURS PRN
Status: DISCONTINUED | OUTPATIENT
Start: 2024-04-03 | End: 2024-04-03 | Stop reason: HOSPADM

## 2024-04-03 RX ORDER — KETOROLAC TROMETHAMINE 30 MG/ML
INJECTION, SOLUTION INTRAMUSCULAR; INTRAVENOUS AS NEEDED
Status: DISCONTINUED | OUTPATIENT
Start: 2024-04-03 | End: 2024-04-03

## 2024-04-03 RX ORDER — MIDAZOLAM HYDROCHLORIDE 1 MG/ML
INJECTION, SOLUTION INTRAMUSCULAR; INTRAVENOUS AS NEEDED
Status: DISCONTINUED | OUTPATIENT
Start: 2024-04-03 | End: 2024-04-03

## 2024-04-03 RX ORDER — LIDOCAINE HYDROCHLORIDE 10 MG/ML
INJECTION INFILTRATION; PERINEURAL AS NEEDED
Status: DISCONTINUED | OUTPATIENT
Start: 2024-04-03 | End: 2024-04-03 | Stop reason: HOSPADM

## 2024-04-03 RX ORDER — LIDOCAINE HYDROCHLORIDE 20 MG/ML
INJECTION, SOLUTION INFILTRATION; PERINEURAL AS NEEDED
Status: DISCONTINUED | OUTPATIENT
Start: 2024-04-03 | End: 2024-04-03

## 2024-04-03 RX ORDER — PROPOFOL 10 MG/ML
INJECTION, EMULSION INTRAVENOUS AS NEEDED
Status: DISCONTINUED | OUTPATIENT
Start: 2024-04-03 | End: 2024-04-03

## 2024-04-03 RX ORDER — LIDOCAINE IN NACL,ISO-OSMOT/PF 30 MG/3 ML
0.1 SYRINGE (ML) INJECTION ONCE
Status: DISCONTINUED | OUTPATIENT
Start: 2024-04-03 | End: 2024-04-03 | Stop reason: HOSPADM

## 2024-04-03 RX ORDER — ALBUTEROL SULFATE 0.83 MG/ML
2.5 SOLUTION RESPIRATORY (INHALATION) ONCE AS NEEDED
Status: DISCONTINUED | OUTPATIENT
Start: 2024-04-03 | End: 2024-04-03 | Stop reason: HOSPADM

## 2024-04-03 RX ORDER — FENTANYL CITRATE 50 UG/ML
INJECTION, SOLUTION INTRAMUSCULAR; INTRAVENOUS AS NEEDED
Status: DISCONTINUED | OUTPATIENT
Start: 2024-04-03 | End: 2024-04-03

## 2024-04-03 RX ORDER — PROPOFOL 10 MG/ML
INJECTION, EMULSION INTRAVENOUS CONTINUOUS PRN
Status: DISCONTINUED | OUTPATIENT
Start: 2024-04-03 | End: 2024-04-03

## 2024-04-03 RX ORDER — FENTANYL CITRATE 50 UG/ML
50 INJECTION, SOLUTION INTRAMUSCULAR; INTRAVENOUS EVERY 5 MIN PRN
Status: DISCONTINUED | OUTPATIENT
Start: 2024-04-03 | End: 2024-04-03 | Stop reason: HOSPADM

## 2024-04-03 RX ORDER — METOCLOPRAMIDE HYDROCHLORIDE 5 MG/ML
10 INJECTION INTRAMUSCULAR; INTRAVENOUS ONCE AS NEEDED
Status: DISCONTINUED | OUTPATIENT
Start: 2024-04-03 | End: 2024-04-03 | Stop reason: HOSPADM

## 2024-04-03 RX ADMIN — PROPOFOL 20 MG: 10 INJECTION, EMULSION INTRAVENOUS at 12:35

## 2024-04-03 RX ADMIN — SODIUM CHLORIDE, POTASSIUM CHLORIDE, SODIUM LACTATE AND CALCIUM CHLORIDE 100 ML/HR: 600; 310; 30; 20 INJECTION, SOLUTION INTRAVENOUS at 12:02

## 2024-04-03 RX ADMIN — MIDAZOLAM 2 MG: 1 INJECTION INTRAMUSCULAR; INTRAVENOUS at 12:16

## 2024-04-03 RX ADMIN — PROPOFOL 150 MCG/KG/MIN: 10 INJECTION, EMULSION INTRAVENOUS at 12:19

## 2024-04-03 RX ADMIN — KETOROLAC TROMETHAMINE 30 MG: 30 INJECTION, SOLUTION INTRAMUSCULAR at 12:24

## 2024-04-03 RX ADMIN — ONDANSETRON 4 MG: 2 INJECTION INTRAMUSCULAR; INTRAVENOUS at 12:24

## 2024-04-03 RX ADMIN — DEXAMETHASONE SODIUM PHOSPHATE 4 MG: 4 INJECTION, SOLUTION INTRAMUSCULAR; INTRAVENOUS at 12:24

## 2024-04-03 RX ADMIN — FENTANYL CITRATE 50 MCG: 50 INJECTION, SOLUTION INTRAMUSCULAR; INTRAVENOUS at 12:19

## 2024-04-03 RX ADMIN — LIDOCAINE HYDROCHLORIDE 50 MG: 20 INJECTION, SOLUTION INFILTRATION; PERINEURAL at 12:19

## 2024-04-03 RX ADMIN — PROPOFOL 60 MG: 10 INJECTION, EMULSION INTRAVENOUS at 12:19

## 2024-04-03 ASSESSMENT — PAIN SCALES - GENERAL
PAINLEVEL_OUTOF10: 0 - NO PAIN

## 2024-04-03 ASSESSMENT — COLUMBIA-SUICIDE SEVERITY RATING SCALE - C-SSRS
2. HAVE YOU ACTUALLY HAD ANY THOUGHTS OF KILLING YOURSELF?: NO
6. HAVE YOU EVER DONE ANYTHING, STARTED TO DO ANYTHING, OR PREPARED TO DO ANYTHING TO END YOUR LIFE?: NO
1. IN THE PAST MONTH, HAVE YOU WISHED YOU WERE DEAD OR WISHED YOU COULD GO TO SLEEP AND NOT WAKE UP?: NO

## 2024-04-03 ASSESSMENT — PAIN - FUNCTIONAL ASSESSMENT
PAIN_FUNCTIONAL_ASSESSMENT: 0-10

## 2024-04-03 NOTE — ANESTHESIA POSTPROCEDURE EVALUATION
Patient: Zuleika Mendoza    Procedure Summary       Date: 04/03/24 Room / Location: [facility] OR 05 / [facility]    Anesthesia Start: 1216 Anesthesia Stop: 1254    Procedure: Lesion Excision/Biopsy Cervical Diagnosis:       Malignant neoplasm of cervix, unspecified site (CMS/HCC)      (Malignant neoplasm of cervix, unspecified site (CMS/HCC) [C53.9])    Surgeons: Colleen Ortez MD Responsible Provider: Maryjane Khan MD    Anesthesia Type: MAC ASA Status: 2            Anesthesia Type: MAC    Vitals Value Taken Time   /73 04/03/24 1329   Temp 36.3 °C (97.3 °F) 04/03/24 1329   Pulse 74 04/03/24 1329   Resp 16 04/03/24 1329   SpO2 98 % 04/03/24 1329       Anesthesia Post Evaluation    Patient location during evaluation: PACU  Patient participation: complete - patient participated  Level of consciousness: awake and alert  Pain management: adequate  Airway patency: patent  Cardiovascular status: acceptable  Respiratory status: acceptable  Hydration status: acceptable  Postoperative Nausea and Vomiting: none        There were no known notable events for this encounter.

## 2024-04-03 NOTE — ANESTHESIA PREPROCEDURE EVALUATION
Patient: Zuleika Mendoza    Procedure Information       Date/Time: 04/03/24 1230    Procedure: Lesion Excision/Biopsy Cervical - Cold knife cone    Location: [facility] OR 05 / [facility] [place] [place] [facility]    Surgeons: Colleen Ortez MD            Relevant Problems   Anesthesia (within normal limits)      Cardiac (within normal limits)      Pulmonary (within normal limits)      Neuro   (+) Multiple sclerosis (CMS/HCC)      Endocrine   (+) Obesity      GYN   (+) Cancer determined by uterine cervix biopsy (CMS/HCC)   (+) Malignant neoplasm of cervix (CMS/HCC)       Clinical information reviewed:   Tobacco  Allergies  Meds   Med Hx  Surg Hx   Fam Hx  Soc Hx        NPO Detail:  NPO/Void Status  Carbohydrate Drink Given Prior to Surgery? : N  Date of Last Liquid: 04/03/24  Time of Last Liquid: 0630  Date of Last Solid: 04/02/24  Time of Last Solid: 2300  Last Intake Type: Clear fluids  Time of Last Void: 1130         Physical Exam    Airway  Mallampati: III  TM distance: >3 FB  Neck ROM: full     Cardiovascular   Rhythm: regular  Rate: normal     Dental    Pulmonary - normal exam     Abdominal            Anesthesia Plan    History of general anesthesia?: yes  History of complications of general anesthesia?: no    ASA 2     MAC     intravenous induction   Anesthetic plan and risks discussed with patient.  Use of blood products discussed with patient who.    Plan discussed with CRNA, CAA and attending.

## 2024-04-03 NOTE — OP NOTE
Lesion Excision/Biopsy Cervical Operative Note     Date: 4/3/2024  OR Location: [facility] OR    Name: Zuleika Mendoza, : 2000, Age: 23 y.o., MRN: [id number], Sex: female    Diagnosis  Pre-op Diagnosis     * Malignant neoplasm of cervix, unspecified site (CMS/HCC) [C53.9] Post-op Diagnosis     * Malignant neoplasm of cervix, unspecified site (CMS/HCC) [C53.9]     Procedures  Lesion Excision/Biopsy Cervical  [address] - [place] BIOPSY CERVIX SINGLE/MULT/EXCISION OF LESION SPX      Surgeons      * Colleen Ortez - Primary    Resident/Fellow/Other Assistant:  Surgeon(s) and Role:    Procedure Summary  Anesthesia: Monitor Anesthesia Care  ASA: II  Anesthesia Staff: Anesthesiologist: Maryjane Khan MD  C-AA: JERMAINE Anton  Estimated Blood Loss: 25mL  Intra-op Medications:   Administrations occurring from 1230 to 1330 on 24:   Medication Name Total Dose   lidocaine (Xylocaine) 10 mg/mL (1 %) injection 9 mL   ferric subsulfate (Astringyn) solution 1 Application              Anesthesia Record               Intraprocedure I/O Totals          Intake    Propofol Drip 0.00 mL    The total shown is the total volume documented since Anesthesia Start was filed.    Total Intake 0 mL          Specimen:   ID Type Source Tests Collected by Time   1 : cold knife cone stitch at 12 Tissue CERVIX [place]E SURGICAL PATHOLOGY EXAM Colleen Ortez MD 4/3/2024 1244   2 : endocervix curettings Tissue ENDOCERVIX CURETTINGS SURGICAL PATHOLOGY EXAM Colleen Ortez MD 4/3/2024 1245        Staff:   Circulator: Willow Mascorro RN  Scrub Person: Aye Swan; Chari Vences RN         Drains and/or Catheters: * None in log *    Tourniquet Times:         Implants:     Findings: Normal appearing cervix and vagina without lesions or masses.    Indications: Zuleika Mendoza is an 23 y.o. female who is having surgery for Malignant neoplasm of cervix, unspecified site (CMS/HCC) [C53.9].     The patient was seen in the  preoperative area. The risks, benefits, complications, treatment options, non-operative alternatives, expected recovery and outcomes were discussed with the patient. The possibilities of reaction to medication, pulmonary aspiration, injury to surrounding structures, bleeding, recurrent infection, the need for additional procedures, failure to diagnose a condition, and creating a complication requiring transfusion or operation were discussed with the patient. The patient concurred with the proposed plan, giving informed consent.  The site of surgery was properly noted/marked if necessary per policy. The patient has been actively warmed in preoperative area. Preoperative antibiotics are not indicated. Venous thrombosis prophylaxis are not indicated.    Procedure Details:   Consent was completed in pre-op holding area after discussing all risks/benefits/alternatives.     Pt was taken to OR with IV in place. Team huddle and timeout were performed with all appropriate team members.  Anesthesia was induced without difficulty. Pt was placed in dorsal lithotomy position with checo stirrups and prepped and draped in the usual sterile fashion.      A Speculum was placed in the vagina and the ant lip was grasped with a single tooth tenaculum.  The cervix was injected with 10cc of lidocaine without epinephrine. 2 stay sutures were placed with 0-vicryl suture, 1 at 3 oclock and one at 9 oclock.  A scalpel was used to incise the cervix in a typical cone fashion without difficulty,  The base was cut with scissors to detach from the remaining cervix.  Specimen was tagged at 12 oclock.  ECC was then performed.  Bovie rollerball was used to achieve hemostasis.  Monsels solution was applied to the bed of the cone.  Hemostasis was achieved.  The stay sutures were cut to approximately 4cm.      Sponge/lap/needle/instrument counts were correct x 2.      Pt was returned to PACU in stable condition.  She will be discharged home from  PACU.    Complications:  None; patient tolerated the procedure well.    Disposition: PACU - hemodynamically stable.  Condition: stable         Additional Details: None    Attending Attestation: I was present and scrubbed for the entire procedure.    Colleen Ortez  Phone Number: [phone]

## 2024-04-04 ASSESSMENT — PAIN SCALES - GENERAL: PAINLEVEL_OUTOF10: 2

## 2024-04-05 ENCOUNTER — TELEPHONE (OUTPATIENT)
Dept: OBSTETRICS AND GYNECOLOGY | Facility: CLINIC | Age: 24
End: 2024-04-05
Payer: COMMERCIAL

## 2024-04-05 NOTE — TELEPHONE ENCOUNTER
Dr. Alvarado,   Patient calling with question.  Patient had a cervical cone bx done with Dr. Ortez on 04-03-24, when should she follow up with you?     Patient number:  [phone].     Thank you

## 2024-04-11 LAB
LABORATORY COMMENT REPORT: NORMAL
PATH REPORT.FINAL DX SPEC: NORMAL
PATH REPORT.GROSS SPEC: NORMAL
PATH REPORT.RELEVANT HX SPEC: NORMAL
PATH REPORT.TOTAL CANCER: NORMAL

## 2024-04-30 ENCOUNTER — APPOINTMENT (OUTPATIENT)
Dept: OBSTETRICS AND GYNECOLOGY | Facility: CLINIC | Age: 24
End: 2024-04-30
Payer: COMMERCIAL

## 2024-04-30 ENCOUNTER — OFFICE VISIT (OUTPATIENT)
Dept: GYNECOLOGIC ONCOLOGY | Facility: HOSPITAL | Age: 24
End: 2024-04-30
Payer: COMMERCIAL

## 2024-04-30 VITALS
WEIGHT: 250 LBS | TEMPERATURE: 97 F | BODY MASS INDEX: 41.6 KG/M2 | DIASTOLIC BLOOD PRESSURE: 82 MMHG | HEART RATE: 71 BPM | RESPIRATION RATE: 18 BRPM | OXYGEN SATURATION: 96 % | SYSTOLIC BLOOD PRESSURE: 123 MMHG

## 2024-04-30 DIAGNOSIS — C53.0 MALIGNANT NEOPLASM OF ENDOCERVIX (MULTI): Primary | ICD-10-CM

## 2024-04-30 PROCEDURE — 99024 POSTOP FOLLOW-UP VISIT: CPT | Performed by: OBSTETRICS & GYNECOLOGY

## 2024-04-30 RX ORDER — VIT C/E/ZN/COPPR/LUTEIN/ZEAXAN 250MG-90MG
25 CAPSULE ORAL DAILY
COMMUNITY

## 2024-04-30 ASSESSMENT — PAIN SCALES - GENERAL: PAINLEVEL: 0-NO PAIN

## 2024-04-30 NOTE — PROGRESS NOTES
Patient ID: Zuleika Mendoza is a 23 y.o. female.  Referring Physician: No referring provider defined for this encounter.  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    Tumor hx:  -24 LEEP with invasive SCC in background of CIN3, endocervical margin 2.5mm from tumor but <1mm from CIN3  -2/15/24 MRI with no residual tumor  - 2024 cone biopsy - no residual dysplasia    PMH:  MS relapsing remitting     PSH:  LEEP  D&C     Ob/Gyn:  , has not had guardasil     SH:  Drinks alcohol rarely denies tobacco or illicit drug use.   Lives with her parents  Works at a [employer]     FH:  No FH of malignancy  Interval History:  Reports a little tenderness and pain for 2 days post operatively, light bleeding, some brown discharge and light spotting after her period.      Objective    BSA: 2.28 meters squared  /82 (BP Location: Right arm, Patient Position: Sitting)   Pulse 71   Temp 36.1 °C (97 °F)   Resp 18   Wt 113 kg (250 lb)   SpO2 96%   BMI 41.60 kg/m²      Physical Exam  Constitutional:       General: She is not in acute distress.     Appearance: Normal appearance.   Cardiovascular:      Rate and Rhythm: Normal rate and regular rhythm.   Pulmonary:      Effort: Pulmonary effort is normal.      Breath sounds: Normal breath sounds.   Abdominal:      General: Bowel sounds are normal. There is no distension.   Musculoskeletal:      Right forearm: Normal.      Left forearm: Normal.      Right hand: Normal.      Left hand: Normal.      Right lower leg: Normal.      Left lower leg: Normal.      Right foot: Normal.      Left foot: Normal.         Performance Status:  Asymptomatic    Assessment/Plan     Oncology History    No history exists.        Problem List Items Addressed This Visit             ICD-10-CM    Malignant neoplasm of cervix (Multi) - Primary C53.9        Treatment Plans       No treatment plans exist        - Reviewed surgical pathology with the patient which showed no new concerning signs of disease.   - Plan for annual PAP.  - Follow up in 4 months with Dr. Danelle Whitfield for cancer surveillance.    Office to look into options for gardasil vaccine.        Scribe Attestation  By signing my name below, IAmanda Scribe   attest that this documentation has been prepared under the direction and in the presence of Danelle Whitfield MD.

## 2024-07-26 ENCOUNTER — APPOINTMENT (OUTPATIENT)
Dept: OBSTETRICS AND GYNECOLOGY | Facility: CLINIC | Age: 24
End: 2024-07-26
Payer: COMMERCIAL

## 2024-07-26 VITALS
HEIGHT: 65 IN | BODY MASS INDEX: 40.89 KG/M2 | WEIGHT: 245.4 LBS | DIASTOLIC BLOOD PRESSURE: 86 MMHG | SYSTOLIC BLOOD PRESSURE: 133 MMHG

## 2024-07-26 DIAGNOSIS — C53.9 MALIGNANT NEOPLASM OF CERVIX, UNSPECIFIED SITE (MULTI): Primary | ICD-10-CM

## 2024-07-26 PROCEDURE — 1036F TOBACCO NON-USER: CPT | Performed by: OBSTETRICS & GYNECOLOGY

## 2024-07-26 PROCEDURE — 99213 OFFICE O/P EST LOW 20 MIN: CPT | Performed by: OBSTETRICS & GYNECOLOGY

## 2024-07-26 PROCEDURE — 3008F BODY MASS INDEX DOCD: CPT | Performed by: OBSTETRICS & GYNECOLOGY

## 2024-07-26 PROCEDURE — 87624 HPV HI-RISK TYP POOLED RSLT: CPT

## 2024-07-26 NOTE — PROGRESS NOTES
Subjective   Patient ID: Zuleika Mendoza is a 23 y.o. female who presents for Repeat Pap (Patient here for repeat pap/Pt had cone bx-4/3/24/Birth control=none/Declined chaperone).  HPI  Patient presents for Pap smear.    Patient is status post cone biopsy April 2024 with no residual dysplasia.  Patient was diagnosed with invasive squamous cell carcinoma in background of HERNÁN-3 January 2024.  Subsequently had MRI with no residual tumor and then cone biopsy April 2024.  Patient states she has been doing well with regular menses.  Had a few episodes of postcoital bleeding after surgery but currently no postcoital bleeding.  Review of Systems    Objective   Physical Exam  Pelvic: External genitalia normal, vagina normal rugated good tone cervix well-healed Pap smear done.  No cervical motion tenderness.  Assessment/Plan   Repeat Pap smear status post LEEP and cone biopsy for squamous cell carcinoma of the cervix.  Patient is followed by GYN oncology as well.  She will follow-up in 3 months for repeat Pap smear.  Of note patient had incidental 3.5 cm endometrioma on left ovary diagnosed by MRI.  Patient remains asymptomatic.  Will schedule ultrasound for January 2025.         Efren Alvarado MD 07/26/24 2:41 PM

## 2024-08-06 ENCOUNTER — APPOINTMENT (OUTPATIENT)
Dept: GYNECOLOGIC ONCOLOGY | Facility: HOSPITAL | Age: 24
End: 2024-08-06
Payer: COMMERCIAL

## 2024-08-10 NOTE — RESULT ENCOUNTER NOTE
Jorgito To,    Good news! Your pap smear came back normal. All we need to do is repeat the pap in 3 months. Have a good weekend.

## 2024-08-27 ENCOUNTER — OFFICE VISIT (OUTPATIENT)
Dept: GYNECOLOGIC ONCOLOGY | Facility: HOSPITAL | Age: 24
End: 2024-08-27
Payer: COMMERCIAL

## 2024-08-27 VITALS
DIASTOLIC BLOOD PRESSURE: 74 MMHG | RESPIRATION RATE: 18 BRPM | BODY MASS INDEX: 40.04 KG/M2 | WEIGHT: 240.3 LBS | OXYGEN SATURATION: 97 % | HEART RATE: 74 BPM | SYSTOLIC BLOOD PRESSURE: 115 MMHG | HEIGHT: 65 IN | TEMPERATURE: 97.3 F

## 2024-08-27 DIAGNOSIS — C53.9: Primary | ICD-10-CM

## 2024-08-27 PROCEDURE — 1036F TOBACCO NON-USER: CPT | Performed by: OBSTETRICS & GYNECOLOGY

## 2024-08-27 PROCEDURE — 3008F BODY MASS INDEX DOCD: CPT | Performed by: OBSTETRICS & GYNECOLOGY

## 2024-08-27 PROCEDURE — 99214 OFFICE O/P EST MOD 30 MIN: CPT | Performed by: OBSTETRICS & GYNECOLOGY

## 2024-08-27 ASSESSMENT — PAIN SCALES - GENERAL: PAINLEVEL: 0-NO PAIN

## 2024-08-27 ASSESSMENT — PATIENT HEALTH QUESTIONNAIRE - PHQ9
1. LITTLE INTEREST OR PLEASURE IN DOING THINGS: NOT AT ALL
2. FEELING DOWN, DEPRESSED OR HOPELESS: NOT AT ALL
SUM OF ALL RESPONSES TO PHQ9 QUESTIONS 1 AND 2: 0

## 2024-08-27 NOTE — PROGRESS NOTES
Patient ID: Zuleika Mendoza is a 24 y.o. female.  Referring Physician: No referring provider defined for this encounter.  Primary Care Provider: No Assigned PCP Generic Provider, MD    Subjective    HPI      Objective    BSA: There is no height or weight on file to calculate BSA.  There were no vitals taken for this visit.     Physical Exam    Performance Status:  {ECOG performance status:03837}    Assessment/Plan     Oncology History Overview Note   Tumor hx:  -1/8/24 LEEP with invasive SCC in background of CIN3, endocervical margin 2.5mm from tumor but <1mm from CIN3  -2/15/24 MRI with no residual tumor  - 4/2024 cone biopsy - no residual dysplasia          {Assess/PlanSmartLinks:70868}    Treatment Plans       No treatment plans exist

## 2024-08-27 NOTE — PROGRESS NOTES
Patient ID: Zuleika Mendoza is a 24 y.o. female.  Referring Physician: No referring provider defined for this encounter.  Primary Care Provider: No Assigned PCP Generic Provider, MD Andres    Zuleika Mendoza is a 24yoF presenting for follow-up of stage IA1 SCC of the ovary s/p LEEP and conization. She is overall doing well and has no complaints. She reports some light spotting after intercourse twice. She endorses bloating and cramping during menses, however denies any bleeding or spotting between her menstrual periods. She denies any irregularity or heaviness of the periods. She denies any weight loss, fatigue, changes in bowel/bladder function, or changes in appetite.          Objective    BSA: There is no height or weight on file to calculate BSA.  There were no vitals taken for this visit.     Physical Exam  Cardiovascular:      Rate and Rhythm: Normal rate and regular rhythm.      Heart sounds: Normal heart sounds.   Pulmonary:      Breath sounds: Normal breath sounds.   Abdominal:      General: There is no distension.      Palpations: Abdomen is soft.      Tenderness: There is no abdominal tenderness.   Skin:     General: Skin is warm.   Neurological:      Mental Status: She is alert and oriented to person, place, and time.         Assessment/Plan     Oncology History Overview Note   Tumor hx:  -1/8/24 LEEP with invasive SCC in background of CIN3, endocervical margin 2.5mm from tumor but <1mm from CIN3  -2/15/24 MRI with no residual tumor  - 4/2024 cone biopsy - no residual dysplasia     Cancer determined by uterine cervix biopsy (Multi)   1/30/2024 Initial Diagnosis    Cancer determined by uterine cervix biopsy (Multi)       Stage IA1 SCC of the cervix  S/p conization on 4/2024 with no residual dysplasia   Negative pap smear and pelvic exam 7/2024  Counseled on timing of pelvic exams and pap smears for cancer surveillance; recommend 6 mo or yearly pap smears with pelvic exams every 4 months  Follow-up  with OB GYN Dr. Alvarado for pelvic exam in October    Moose Davis, M3

## 2024-08-27 NOTE — PROGRESS NOTES
"Patient ID: Zuleika Mendoza is a 24 y.o. female.  Referring Physician: No referring provider defined for this encounter.  Primary Care Provider: No Assigned PCP Generic Provider, MD Andres    Zuleika Mendoza is a 24yoF presenting for follow-up of stage IA1 SCC of the ovary s/p LEEP and conization. She is overall doing well and has no complaints. She reports some light spotting after intercourse twice. She endorses bloating and cramping during menses, however denies any bleeding or spotting between her menstrual periods. She denies any irregularity or heaviness of the periods. She denies any weight loss, fatigue, changes in bowel/bladder function, or changes in appetite.          Objective    BSA: 2.24 meters squared  /74   Pulse 74   Temp 36.3 °C (97.3 °F) (Temporal)   Resp 18   Ht 1.651 m (5' 5\")   Wt 109 kg (240 lb 4.8 oz)   SpO2 97%   BMI 39.99 kg/m²      Physical Exam  Cardiovascular:      Rate and Rhythm: Normal rate and regular rhythm.      Heart sounds: Normal heart sounds.   Pulmonary:      Breath sounds: Normal breath sounds.   Abdominal:      General: There is no distension.      Palpations: Abdomen is soft.      Tenderness: There is no abdominal tenderness.   Skin:     General: Skin is warm.   Neurological:      Mental Status: She is alert and oriented to person, place, and time.           Assessment/Plan     Oncology History Overview Note   Tumor hx:  -1/8/24 LEEP with invasive SCC in background of CIN3, endocervical margin 2.5mm from tumor but <1mm from CIN3  -2/15/24 MRI with no residual tumor  - 4/2024 cone biopsy - no residual dysplasia     Cancer determined by uterine cervix biopsy (Multi)   1/30/2024 Initial Diagnosis    Cancer determined by uterine cervix biopsy (Multi)       Stage IA1 SCC of the cervix  S/p conization on 4/2024 with no residual dysplasia   Negative pap smear and pelvic exam 7/2024  Counseled on timing of pelvic exams and pap smears for cancer surveillance; " recommend 6 mo or yearly pap smears with pelvic exams every 4 months  Follow-up with OB GYN Dr. Alvarado for pelvic exam in October    Moose Davis, M3

## 2024-10-29 ENCOUNTER — APPOINTMENT (OUTPATIENT)
Dept: OBSTETRICS AND GYNECOLOGY | Facility: CLINIC | Age: 24
End: 2024-10-29
Payer: COMMERCIAL

## 2024-10-29 VITALS
BODY MASS INDEX: 39.7 KG/M2 | WEIGHT: 238.3 LBS | DIASTOLIC BLOOD PRESSURE: 80 MMHG | SYSTOLIC BLOOD PRESSURE: 114 MMHG | HEIGHT: 65 IN

## 2024-10-29 DIAGNOSIS — C53.9: Primary | ICD-10-CM

## 2024-10-29 PROCEDURE — 99213 OFFICE O/P EST LOW 20 MIN: CPT | Performed by: OBSTETRICS & GYNECOLOGY

## 2024-10-29 PROCEDURE — 87624 HPV HI-RISK TYP POOLED RSLT: CPT

## 2025-04-08 ENCOUNTER — APPOINTMENT (OUTPATIENT)
Dept: OBSTETRICS AND GYNECOLOGY | Facility: CLINIC | Age: 25
End: 2025-04-08
Payer: COMMERCIAL

## 2025-04-08 VITALS
SYSTOLIC BLOOD PRESSURE: 119 MMHG | HEIGHT: 65 IN | BODY MASS INDEX: 40.73 KG/M2 | DIASTOLIC BLOOD PRESSURE: 81 MMHG | WEIGHT: 244.5 LBS

## 2025-04-08 DIAGNOSIS — C53.9 MALIGNANT NEOPLASM OF CERVIX, UNSPECIFIED SITE (MULTI): ICD-10-CM

## 2025-04-08 DIAGNOSIS — Z01.419 ENCOUNTER FOR ANNUAL ROUTINE GYNECOLOGICAL EXAMINATION: Primary | ICD-10-CM

## 2025-04-08 PROCEDURE — 99395 PREV VISIT EST AGE 18-39: CPT | Performed by: OBSTETRICS & GYNECOLOGY

## 2025-04-08 PROCEDURE — 3008F BODY MASS INDEX DOCD: CPT | Performed by: OBSTETRICS & GYNECOLOGY

## 2025-04-08 PROCEDURE — 1036F TOBACCO NON-USER: CPT | Performed by: OBSTETRICS & GYNECOLOGY

## 2025-04-08 NOTE — PROGRESS NOTES
Subjective   Patient ID: Zuleika Mendoza is a 24 y.o. female who presents for Annual Exam (Patient here for annual/ Contraception-none/Declined std screening/Declined chaperone).  HPI  Patient is a 24-year-old who presents for annual exam.  Last menstrual period March 11, 2025.  Patient currently does not use anything for birth control.  Known history of MS for which she gets twice yearly infusions and is doing well.  Recently diagnosed with invasive squamous cell carcinoma of the cervix.  Had cone biopsy done by GYN oncology with no residual disease.  Recommendation for every 6 month Pap smears.  Last Pap smear October 2024 normal.  Review of Systems    Objective   Physical Exam  Gen.: Alert and in no acute distress. Well-developed, well-nourished.  Thyroid: Nonenlarged and no palpable thyroid nodules  Cardiovascular: Heart regular rate and rhythm  Pulmonary: Clear bilateral breath sounds  Breasts: Normal appearance, no nipple discharge and no skin changes. No palpable masses and no axillary lymphadenopathy  Abdomen: Soft and nontender, no abdominal mass palpated, no organomegaly   Pelvic: External genitalia normal, Bartholin's urethral and [place]'s glands normal. Vagina normal. Cervix normal. Uterus normal size and shape. Right adnexa normal without masses. Left adnexa normal without masses. Perianal area and normal.  Assessment/Plan   Annual GYN exam, Pap smear done, will contact patient with results.  She will follow-up in 6 months for repeat Pap smear.         Efren Alvarado MD 04/08/25 1:24 PM

## 2025-04-29 LAB
CYTOLOGY CMNT CVX/VAG CYTO-IMP: NORMAL
LAB AP HPV GENOTYPE QUESTION: NO
LAB AP HPV HR: NORMAL
LABORATORY COMMENT REPORT: NORMAL
LMP START DATE: NORMAL
PATH REPORT.TOTAL CANCER: NORMAL

## 2025-05-05 ENCOUNTER — TELEPHONE (OUTPATIENT)
Dept: OBSTETRICS AND GYNECOLOGY | Facility: CLINIC | Age: 25
End: 2025-05-05
Payer: COMMERCIAL

## 2025-05-05 NOTE — TELEPHONE ENCOUNTER
Pt calling with a few questions pertaining MyChart message from .     LS- 4/8/25  6 MO FUV- 10/14/25    Pt #- [phone]

## 2025-05-12 NOTE — TELEPHONE ENCOUNTER
Spoke to patient on the phone.  I reached out to GYN oncology  Priya.  Patient will call Priya and schedule colposcopy through GYN oncology.

## 2025-05-15 NOTE — PROGRESS NOTES
Patient ID: Zuleika Mendoza is a 24 y.o. female.  Referring Physician: No referring provider defined for this encounter.  Primary Care Provider: No Assigned PCP Generic Provider, MD Andres    Zuleika Mendoza is a 24yoF presenting for follow-up of stage IA1 SCC of the cervix s/p LEEP and conization.       Stage IA1 SCC of the cervix  S/p conization on 4/2024 with no residual dysplasia     Pap 7/2024:  Negative  Pap 10/2024:   Negative  Pap 4/2025:   ASC-H    Objective    BSA: 2.29 meters squared  /78 (BP Location: Left arm, Patient Position: Sitting, BP Cuff Size: Adult)   Pulse 80   Temp 36.4 °C (97.5 °F)   Resp 16   Wt 114 kg (252 lb)   SpO2 96%   BMI 41.93 kg/m²     Interval:   Here for colposcopy. Been recently sexually active with new partner.   She denies any changes in her periods or dyspareunia.       Physical Exam:    Constitutional: Doing well. ESTEFANÍA  Eyes: PERRL  ENMT: Moist mucus membranes  Head/Neck: Supple. Symmetrical  Cardiovascular: Regular, rate and rhythm. 2+ equal pulses of the extremities  Respiratory/Thorax: CTA. RRR. Chest rise symmetrical.  Gastrointestinal: Non-distended, soft, non-tender  Genitourinary:   Colposcopy: Adequate colposcopy performed after application of dilute acetic acid solution to the cervix.  No acetowhite changes noted.  There were some CKC changes noted. ECC was obtained.   The patient tolerated the procedure well.   Musculoskeletal: ROM intact, no joint swelling, normal strength  Extremities: No edema  Neurological: Alert and oriented x 3. Pleasant and cooperative.  Lymphatic: No lymphadenopathy. No lymphedema  Psychological: Appropriate mood and behavior  Skin: Warm and dry, no lesions, no rashes    A complete review of systems was performed and all systems were normal except what is noted in the interval history.      Assessment/Plan   Patient is a 24 year old female with Stage IA1 SCC of the cervix s/p CKC 4/2024.      Plan: ECC today, F/U results  If  HERNÁN 2-3 or carcinoma will refer to Dr. Whitfield for surgical management  If HERNÁN 1 or negative repeat pap in 6 months    Oncology History Overview Note   Tumor hx:  -1/8/24 LEEP with invasive SCC in background of CIN3, endocervical margin 2.5mm from tumor but <1mm from CIN3  -2/15/24 MRI with no residual tumor  - 4/2024 cone biopsy - no residual dysplasia     Cancer determined by uterine cervix biopsy (Multi)   1/30/2024 Initial Diagnosis    Cancer determined by uterine cervix biopsy (Multi)

## 2025-05-16 ENCOUNTER — OFFICE VISIT (OUTPATIENT)
Dept: GYNECOLOGIC ONCOLOGY | Facility: HOSPITAL | Age: 25
End: 2025-05-16
Payer: COMMERCIAL

## 2025-05-16 VITALS
HEART RATE: 80 BPM | TEMPERATURE: 97.5 F | RESPIRATION RATE: 16 BRPM | DIASTOLIC BLOOD PRESSURE: 78 MMHG | BODY MASS INDEX: 41.93 KG/M2 | WEIGHT: 252 LBS | SYSTOLIC BLOOD PRESSURE: 112 MMHG | OXYGEN SATURATION: 96 %

## 2025-05-16 DIAGNOSIS — C53.0 MALIGNANT NEOPLASM OF ENDOCERVIX (MULTI): Primary | ICD-10-CM

## 2025-05-16 PROCEDURE — 57505 ENDOCERVICAL CURETTAGE: CPT | Performed by: NURSE PRACTITIONER

## 2025-05-16 PROCEDURE — 88305 TISSUE EXAM BY PATHOLOGIST: CPT | Performed by: STUDENT IN AN ORGANIZED HEALTH CARE EDUCATION/TRAINING PROGRAM

## 2025-05-16 PROCEDURE — 88305 TISSUE EXAM BY PATHOLOGIST: CPT | Mod: TC,SUR | Performed by: NURSE PRACTITIONER

## 2025-05-16 PROCEDURE — 57420 EXAM OF VAGINA W/SCOPE: CPT | Performed by: NURSE PRACTITIONER

## 2025-05-16 ASSESSMENT — PATIENT HEALTH QUESTIONNAIRE - PHQ9
2. FEELING DOWN, DEPRESSED OR HOPELESS: NOT AT ALL
SUM OF ALL RESPONSES TO PHQ9 QUESTIONS 1 & 2: 0
1. LITTLE INTEREST OR PLEASURE IN DOING THINGS: NOT AT ALL

## 2025-05-16 ASSESSMENT — PAIN SCALES - GENERAL: PAINLEVEL_OUTOF10: 0-NO PAIN

## 2025-06-02 LAB
LAB AP ASR DISCLAIMER: NORMAL
LABORATORY COMMENT REPORT: NORMAL
PATH REPORT.FINAL DX SPEC: NORMAL
PATH REPORT.GROSS SPEC: NORMAL
PATH REPORT.RELEVANT HX SPEC: NORMAL
PATH REPORT.TOTAL CANCER: NORMAL

## 2025-06-02 PROCEDURE — 88342 IMHCHEM/IMCYTCHM 1ST ANTB: CPT | Performed by: STUDENT IN AN ORGANIZED HEALTH CARE EDUCATION/TRAINING PROGRAM

## 2025-06-02 PROCEDURE — 88342 IMHCHEM/IMCYTCHM 1ST ANTB: CPT | Mod: TC,SUR | Performed by: NURSE PRACTITIONER

## 2025-06-04 ENCOUNTER — TELEPHONE (OUTPATIENT)
Dept: GYNECOLOGIC ONCOLOGY | Facility: CLINIC | Age: 25
End: 2025-06-04
Payer: COMMERCIAL

## 2025-06-05 ENCOUNTER — TELEPHONE (OUTPATIENT)
Dept: GYNECOLOGIC ONCOLOGY | Facility: HOSPITAL | Age: 25
End: 2025-06-05
Payer: COMMERCIAL

## 2025-06-05 NOTE — TELEPHONE ENCOUNTER
Phoned patient to notify that ECC results are negative and ok to keep appointment as scheduled with ob/gyn Dr. Alvarado in October.   Patient verbalized her understanding of information given.

## 2025-07-28 ENCOUNTER — APPOINTMENT (OUTPATIENT)
Dept: OBSTETRICS AND GYNECOLOGY | Facility: CLINIC | Age: 25
End: 2025-07-28
Payer: COMMERCIAL

## 2025-07-28 VITALS
BODY MASS INDEX: 42.3 KG/M2 | WEIGHT: 253.9 LBS | DIASTOLIC BLOOD PRESSURE: 86 MMHG | SYSTOLIC BLOOD PRESSURE: 122 MMHG | HEIGHT: 65 IN

## 2025-07-28 DIAGNOSIS — R10.31 RLQ ABDOMINAL PAIN: Primary | ICD-10-CM

## 2025-07-28 DIAGNOSIS — N76.0 VAGINITIS AND VULVOVAGINITIS: ICD-10-CM

## 2025-07-28 DIAGNOSIS — Z11.3 ROUTINE SCREENING FOR STI (SEXUALLY TRANSMITTED INFECTION): ICD-10-CM

## 2025-07-28 DIAGNOSIS — N80.102: ICD-10-CM

## 2025-07-28 PROCEDURE — 1036F TOBACCO NON-USER: CPT | Performed by: NURSE PRACTITIONER

## 2025-07-28 PROCEDURE — 99214 OFFICE O/P EST MOD 30 MIN: CPT | Performed by: NURSE PRACTITIONER

## 2025-07-28 PROCEDURE — 3008F BODY MASS INDEX DOCD: CPT | Performed by: NURSE PRACTITIONER

## 2025-07-28 RX ORDER — TRIAMCINOLONE ACETONIDE 1 MG/G
OINTMENT TOPICAL 2 TIMES DAILY PRN
Qty: 15 G | Refills: 1 | Status: SHIPPED | OUTPATIENT
Start: 2025-07-28

## 2025-07-28 NOTE — PROGRESS NOTES
"Subjective   Zuleika Mendoza is a 24 y.o. female who presents for evaluation of abdominal pain x1 month. Seems to flare on weekends. Hurt to lift right leg flares in groin area, but now feeling much better. The pain is described as soreness, and is now 0/10 in intensity. Pain is located in the RLQ and right groin area with radiation to the right hip. Onset was gradual occurring 1 month ago. Symptoms have been gradually improving since. Aggravating factors: activity and moving right leg especially painful in and out of bed. Alleviating factors: not moving right leg and did not try any pain meds. Associated symptoms: none. The patient denies anorexia, chills, constipation, diarrhea, dysuria, fever, and nausea. Risk factors for pelvic/abdominal pain include ovarian cysts and h/o invasive squamous cell cervical cancer, had cone biopsy showing no residual disease, and last pap 2025 ASC-H and colpo/ECC benign- pap q 6mo. S/p LEEP   Desires STI testing today, denies major symptoms, possibly yeast infection, feeling some itching outer vulvar area, intermittent past month or two  Casual sexual relationship one partner and he does not having symptoms  Pt has h/o multiple sclerosis, [facility]  Last imaging 2024 MR of pelvis, incidental note 3.5cm left ovarian endometrioma    Menstrual History:  OB History          1    Para   0    Term                AB        Living             SAB        IAB        Ectopic        Multiple        Live Births                    Patient's last menstrual period was 2025 (exact date).         Objective   /86   Ht 1.651 m (5' 5\")   Wt 115 kg (253 lb 14.4 oz)   LMP 2025 (Exact Date)   BMI 42.25 kg/m²     General:   alert and oriented, in no acute distress           Abdomen: soft, non-tender, without masses or organomegaly   Vulva: normal, Bartholin's, Urethra, Rosenhayn's normal, slight erythema lower introitus and between labia   Vagina: normal " mucosa, normal discharge, thin white   Cervix: no cervical motion tenderness, no lesions, and nulliparous appearance   Uterus: normal size, mobile, non-tender, normal shape and consistency exam limited d/t body habitus   Adnexa: no mass, fullness, tenderness       Imaging  MRI - Pelvis reviewed from last year 2/2024    Assessment/Plan   Diagnoses and all orders for this visit:  RLQ abdominal pain  -     US PELVIS TRANSABDOMINAL WITH TRANSVAGINAL; Future  Endometrial cystoma of left ovary  -     US PELVIS TRANSABDOMINAL WITH TRANSVAGINAL; Future  Routine screening for STI (sexually transmitted infection)  -     C. trachomatis / N. gonorrhoeae, Amplified, Urogenital  -     Trichomonas vaginalis, Amplified  Vaginitis and vulvovaginitis  -     Vaginitis Gram Stain For Bacterial Vaginosis + Yeast  -     triamcinolone (Kenalog) 0.1 % ointment; Apply topically 2 times a day as needed for irritation.  RLQ pain has resolved, pt has h/o left endometrioma, rec repeat study for evaluation, ultrasound ordered.  STI screening sent, pt accepts for GC/Ct and trichomonas.  Vaginal culture r/o infection for Bv/yeast, will treat if indicated. Topical given for vulvar irritation triamcinolone BID PRN and discussed sitz baths/soaks for soothing skin.  RTC annual and PRN

## 2025-07-29 LAB
BV SCORE VAG QL: NORMAL
C TRACH RRNA SPEC QL NAA+PROBE: NOT DETECTED
N GONORRHOEA RRNA SPEC QL NAA+PROBE: NOT DETECTED
QUEST GC CT AMPLIFIED (ALWAYS MESSAGE): NORMAL
T VAGINALIS RRNA SPEC QL NAA+PROBE: NOT DETECTED

## 2025-08-12 ENCOUNTER — HOSPITAL ENCOUNTER (OUTPATIENT)
Dept: RADIOLOGY | Facility: HOSPITAL | Age: 25
Discharge: HOME | End: 2025-08-12
Payer: COMMERCIAL

## 2025-08-12 DIAGNOSIS — N80.102: ICD-10-CM

## 2025-08-12 DIAGNOSIS — R10.31 RLQ ABDOMINAL PAIN: ICD-10-CM

## 2025-08-12 PROCEDURE — 76830 TRANSVAGINAL US NON-OB: CPT | Performed by: RADIOLOGY

## 2025-08-12 PROCEDURE — 76856 US EXAM PELVIC COMPLETE: CPT

## 2025-08-12 PROCEDURE — 76856 US EXAM PELVIC COMPLETE: CPT | Performed by: RADIOLOGY

## 2025-10-14 ENCOUNTER — APPOINTMENT (OUTPATIENT)
Dept: OBSTETRICS AND GYNECOLOGY | Facility: CLINIC | Age: 25
End: 2025-10-14
Payer: COMMERCIAL

## (undated) DEVICE — Device

## (undated) DEVICE — PAD, SANITARY, OBSTETRICAL, W/ADHESIVE STRIP, WING, 11 IN, NS

## (undated) DEVICE — COUNTER, NEEDLE, FOAM BLOCK, W/MAGNET, W/BLADE GUARD, 10 COUNT, RED, LF

## (undated) DEVICE — GLOVE, SURGICAL, PROTEXIS PI , 6.0, PF, LF

## (undated) DEVICE — NEEDLE, SPINAL, 20 G X 3.5 IN, YELLOW HUB

## (undated) DEVICE — DRESSING, NON-ADHERENT, TELFA, OUCHLESS, 3 X 8 IN, STERILE

## (undated) DEVICE — TUBING, CLEAR N-COND, 5MM X 10, LF

## (undated) DEVICE — SYRINGE, 10 CC, SLIP TIP

## (undated) DEVICE — PAD, GROUNDING, ELECTROSURGICAL, W/9 FT CABLE, POLYHESIVE II, ADULT, LF

## (undated) DEVICE — APPLICATOR, PROCTOSCOPIC, COTTON TIP, 16 IN

## (undated) DEVICE — ELECTRODE, ELECTROSURGICAL, BALL TIP, LLETZ, LARGE, 5 MM X 13 CM, STAINLESS STEEL

## (undated) DEVICE — PREP TRAY, SKIN, DRY, W/GLOVES

## (undated) DEVICE — COUNTER, NEEDLE, FOAM BLOCK, W/MAGNET, 20 COUNT

## (undated) DEVICE — NEEDLE, HYPODERMIC, SAFETYGLIDE, SHIELDING, REGULAR WALL, REGULAR BEVEL, 22 G X 1.5 IN, BLACK HUB

## (undated) DEVICE — SUTURE, VICRYL PLUS 2-0, CT-1, 27IN, UNDYED

## (undated) DEVICE — SLEEVE, VASO PRESS, CALF GARMENT, MEDIUM, GREEN

## (undated) DEVICE — CAUTERY, PENCIL, PUSH BUTTON, SMOKE EVAC, 70MM